# Patient Record
Sex: FEMALE | Race: WHITE | NOT HISPANIC OR LATINO | Employment: OTHER | URBAN - METROPOLITAN AREA
[De-identification: names, ages, dates, MRNs, and addresses within clinical notes are randomized per-mention and may not be internally consistent; named-entity substitution may affect disease eponyms.]

---

## 2018-05-25 ENCOUNTER — OFFICE VISIT (OUTPATIENT)
Dept: PODIATRY | Facility: CLINIC | Age: 83
End: 2018-05-25
Payer: MEDICARE

## 2018-05-25 VITALS
HEART RATE: 66 BPM | SYSTOLIC BLOOD PRESSURE: 162 MMHG | BODY MASS INDEX: 28.32 KG/M2 | TEMPERATURE: 98 F | DIASTOLIC BLOOD PRESSURE: 97 MMHG | HEIGHT: 61 IN | WEIGHT: 150 LBS

## 2018-05-25 DIAGNOSIS — M79.672 FOOT PAIN, LEFT: ICD-10-CM

## 2018-05-25 DIAGNOSIS — M21.962 ACQUIRED DEFORMITY OF JOINT OF LEFT FOOT: Primary | ICD-10-CM

## 2018-05-25 DIAGNOSIS — M20.12 HALLUX VALGUS OF LEFT FOOT: ICD-10-CM

## 2018-05-25 DIAGNOSIS — M19.90 ARTHRITIS: ICD-10-CM

## 2018-05-25 PROCEDURE — 99214 OFFICE O/P EST MOD 30 MIN: CPT | Mod: S$PBB,,, | Performed by: PODIATRIST

## 2018-05-25 PROCEDURE — 99999 PR PBB SHADOW E&M-NEW PATIENT-LVL II: CPT | Mod: PBBFAC,,, | Performed by: PODIATRIST

## 2018-05-25 PROCEDURE — 99202 OFFICE O/P NEW SF 15 MIN: CPT | Mod: PBBFAC | Performed by: PODIATRIST

## 2018-05-30 NOTE — PROGRESS NOTES
Subjective:      Patient ID: Chon Pickett is a 89 y.o. female.    Chief Complaint: Foot Pain  Patient presents with complaint of pain left foot due to severe bunion andunderlying bony deformity which has caused a chronic callus for many years.  Patient uses Dr. Shea donut pads and wears appropriate shoes.  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    ROS     Constitutional   Constitutional: Well-developed, well-nourished, no distress, no fever, no night          sweats, no significant weight gain, no significant          weight loss, no exercise intolerance     Eyes         Eyes: no dry eyes, no irritation, no vision change     ENMT   Ears: no difficulty hearing, no ear pain   Nose: no frequent nosebleeds, no nose/sinus problems   Mouth/Throat: no sore throat, MUMBLED SPEECH     Cardiovascular          Cardiovascular: no chest pain, no arm pain on exertion, no shortness of breath                  when walking, no palpitations    Respiratory           Respiratory: no cough, no wheezing, no congestion    Gastrointestinal   Gastrointestinal: no abdominal pain, no vomiting, normal appetite, no diarrhea,                no vomitting    Genitourinary   Genitourinary: no incontinence, no difficulty urinating, no increased frequency     Musculoskeletal        Musculoskeletal: no muscle aches, YES muscle weakness, YES arthralgias/joint pain,                   no back pain, no swelling in the extremities    Integumentary   Skin: no abnormal mole, no jaundice, no rashes     Neurologic          Neurologic: no loss of consciousness, YES weakness, no numbness, no seizures,                   no dizziness, no headaches     Psychiatric   Psych: no depression, no sleep disturbances    Endocrine   Endocrine: no fatigue     Hematologic/Lymphatic         No bruising     Allergic/Immunologic    Allergy/Immunologic: no runny nose, no sinus pressure, no itching, no hives,              no   frequent sneezing             Objective:      Physical Exam  Vascular   Arterial Pulses Right: posterior tibialis 1/4, dorsalis pedis 1/4   Arterial Pulses Left: posterior tibialis 1/4, dorsalis pedis 1/4   Mildlower extremity edema bilateral   Varicosities Right: capillary refill test normal   Varicosities Left: capillary refill test normal (pedal skin color and temperature are normal bilateral)   Foot Right: shiny, atrophic skin changes  Foot Left: shiny, atrophic skin changes     Integumentary   Moderate tender hyperkeratotic tissue sub 2nd MPJ left foot, due to elevated/overriding 2nd digit left foot with plantar flexed 2nd metatarsal head.  Upon debridement there is no discoloration.  There is minimal edema, no erythema or calor.  It is obvious where patient uses donut pad over this area on a regular basis.  This is a developed a chronic bulge of skin which herniated through the center of the donut pad and contributes to patient's pain.  Skin texture is soft, thin, tight, fragile. Severe HAV deformity left foot with significant lateral deviation of hallux. Arthritic, bony foot type with arthritic deformities, lack of fat pad bilateral. No skin breaks, bruises, abrasions bilateral feet.     Neurological   Neurological Right: gross sensation intact   Neurological Left: gross sensation intact, no parasthesias bilateral   Manual Muscle Test:  Weekend but consistent with age and history of stroke.  Overall it is much improved and patient is using her walker today.    Musculoskeletal   Muscle Strength and Tone Right: normal, normal tone   Muscle Strength and Tone Left: normal, normal tone   Joints, Bones, and Muscles Right: contractures, prominence   Joints, Bones, and Muscles Left: contractures (left greater than right foot), pain to palpation, prominence, hammertoe deformity (second left), HAV with bunion (arthritic and degenerative changes throughout both feet)           Assessment:       Encounter Diagnoses    Name Primary?   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  Advised patient there is some mild inflammation and changes to the skin due to the donut pad she is using.  I recommended that she utilize a flatter pad to help keep the skin from protruding through the center of the callus cushion.  Advised patient this is contributing to raise skin in this area.  Advised patient that her pad is not effective she can continue to use this, but it appears it may be causing additional complications.  We reviewed other appropriate padding to try in this area.  I also reviewed cushioned insoles that she can use in her shoes.  Encouraged patient to continue wearing a walking shoe at all times, should be placed on her feet prior to getting out of bed in the morning.  Encouraged patient to have someone check this area for her on a regular basis and contact the office immediately with any changes or recurrence of pain.  Patient related she was in understanding and agreement  I counseled the patient on her conditions, their implications and medical management.  Instructed patient to contact the office with any changes, questions, concerns, worsening of symptoms. Patient verbalized understanding.   Total face to face time, exam, assessment, treatment, discussion, 25 minutes, more than half this time spent on consultation and coordination of care.   Follow up as needed.

## 2018-06-26 ENCOUNTER — LAB VISIT (OUTPATIENT)
Dept: LAB | Facility: HOSPITAL | Age: 83
End: 2018-06-26
Attending: INTERNAL MEDICINE
Payer: MEDICARE

## 2018-06-26 DIAGNOSIS — N39.0 URINARY TRACT INFECTION, SITE NOT SPECIFIED: Primary | ICD-10-CM

## 2018-06-26 LAB
BACTERIA #/AREA URNS HPF: ABNORMAL /HPF
BILIRUB UR QL STRIP: NEGATIVE
CLARITY UR: CLEAR
COLOR UR: YELLOW
GLUCOSE UR QL STRIP: NEGATIVE
HGB UR QL STRIP: NEGATIVE
HYALINE CASTS #/AREA URNS LPF: 0 /LPF
KETONES UR QL STRIP: NEGATIVE
LEUKOCYTE ESTERASE UR QL STRIP: ABNORMAL
MICROSCOPIC COMMENT: ABNORMAL
NITRITE UR QL STRIP: NEGATIVE
PH UR STRIP: 5 [PH] (ref 5–8)
PROT UR QL STRIP: ABNORMAL
RBC #/AREA URNS HPF: 3 /HPF (ref 0–4)
SP GR UR STRIP: 1.01 (ref 1–1.03)
SQUAMOUS #/AREA URNS HPF: 3 /HPF
URN SPEC COLLECT METH UR: ABNORMAL
UROBILINOGEN UR STRIP-ACNC: NEGATIVE EU/DL
WBC #/AREA URNS HPF: 10 /HPF (ref 0–5)

## 2018-06-26 PROCEDURE — 87086 URINE CULTURE/COLONY COUNT: CPT

## 2018-06-26 PROCEDURE — 81000 URINALYSIS NONAUTO W/SCOPE: CPT

## 2018-06-27 ENCOUNTER — LAB VISIT (OUTPATIENT)
Dept: LAB | Facility: HOSPITAL | Age: 83
End: 2018-06-27
Attending: INTERNAL MEDICINE
Payer: MEDICARE

## 2018-06-27 DIAGNOSIS — R19.7 DIARRHEA OF PRESUMED INFECTIOUS ORIGIN: Primary | ICD-10-CM

## 2018-06-27 DIAGNOSIS — R19.7 DIARRHEA: ICD-10-CM

## 2018-06-27 LAB
BACTERIA UR CULT: NO GROWTH
C DIFF GDH STL QL: NEGATIVE
C DIFF TOX A+B STL QL IA: NEGATIVE
OB PNL STL: NEGATIVE
RV AG STL QL IA.RAPID: NEGATIVE

## 2018-06-27 PROCEDURE — 82272 OCCULT BLD FECES 1-3 TESTS: CPT

## 2018-06-27 PROCEDURE — 87045 FECES CULTURE AEROBIC BACT: CPT

## 2018-06-27 PROCEDURE — 87425 ROTAVIRUS AG IA: CPT

## 2018-06-27 PROCEDURE — 87449 NOS EACH ORGANISM AG IA: CPT

## 2018-06-27 PROCEDURE — 87046 STOOL CULTR AEROBIC BACT EA: CPT

## 2018-06-27 PROCEDURE — 87209 SMEAR COMPLEX STAIN: CPT

## 2018-06-27 PROCEDURE — 87328 CRYPTOSPORIDIUM AG IA: CPT

## 2018-06-27 PROCEDURE — 87427 SHIGA-LIKE TOXIN AG IA: CPT | Mod: 59

## 2018-06-27 PROCEDURE — 87798 DETECT AGENT NOS DNA AMP: CPT | Mod: 59

## 2018-06-28 LAB
CRYPTOSP AG STL QL IA: NEGATIVE
G LAMBLIA AG STL QL IA: NORMAL

## 2018-06-29 LAB
E COLI SXT1 STL QL IA: NEGATIVE
E COLI SXT2 STL QL IA: NEGATIVE
O+P STL TRI STN: NORMAL

## 2018-06-30 LAB
NOROVIRUS GI RNA STL QL NAA+PROBE: NOT DETECTED
NOROVIRUS GII RNA STL QL NAA+PROBE: NOT DETECTED
SPECIMEN SOURCE: NORMAL

## 2018-07-02 LAB — BACTERIA STL CULT: NORMAL

## 2018-07-03 ENCOUNTER — LAB VISIT (OUTPATIENT)
Dept: LAB | Facility: HOSPITAL | Age: 83
End: 2018-07-03
Attending: INTERNAL MEDICINE
Payer: MEDICARE

## 2018-07-03 DIAGNOSIS — J18.9 UNRESOLVED PNEUMONIA: ICD-10-CM

## 2018-07-03 DIAGNOSIS — J44.1 OBSTRUCTIVE CHRONIC BRONCHITIS WITH EXACERBATION: Primary | ICD-10-CM

## 2018-07-03 LAB
ALBUMIN SERPL BCP-MCNC: 4.1 G/DL
ALP SERPL-CCNC: 50 U/L
ALT SERPL W/O P-5'-P-CCNC: 13 U/L
ANION GAP SERPL CALC-SCNC: 9 MMOL/L
AST SERPL-CCNC: 22 U/L
BILIRUB SERPL-MCNC: 0.2 MG/DL
BNP SERPL-MCNC: 817 PG/ML
BUN SERPL-MCNC: 22 MG/DL
CALCIUM SERPL-MCNC: 9.1 MG/DL
CHLORIDE SERPL-SCNC: 110 MMOL/L
CO2 SERPL-SCNC: 20 MMOL/L
CREAT SERPL-MCNC: 1.3 MG/DL
EST. GFR  (AFRICAN AMERICAN): 40.3 ML/MIN/1.73 M^2
EST. GFR  (NON AFRICAN AMERICAN): 35 ML/MIN/1.73 M^2
GLUCOSE SERPL-MCNC: 118 MG/DL
POTASSIUM SERPL-SCNC: 3.2 MMOL/L
PROT SERPL-MCNC: 7.3 G/DL
SODIUM SERPL-SCNC: 139 MMOL/L

## 2018-07-03 PROCEDURE — 83880 ASSAY OF NATRIURETIC PEPTIDE: CPT

## 2018-07-03 PROCEDURE — 80053 COMPREHEN METABOLIC PANEL: CPT

## 2018-07-03 PROCEDURE — 36415 COLL VENOUS BLD VENIPUNCTURE: CPT

## 2018-07-17 ENCOUNTER — LAB VISIT (OUTPATIENT)
Dept: LAB | Facility: HOSPITAL | Age: 83
End: 2018-07-17
Attending: NURSE PRACTITIONER
Payer: MEDICARE

## 2018-07-17 DIAGNOSIS — J44.1 OBSTRUCTIVE CHRONIC BRONCHITIS WITH EXACERBATION: Primary | ICD-10-CM

## 2018-07-17 LAB
ANION GAP SERPL CALC-SCNC: 10 MMOL/L
BNP SERPL-MCNC: 701 PG/ML
BUN SERPL-MCNC: 22 MG/DL
CALCIUM SERPL-MCNC: 9.1 MG/DL
CHLORIDE SERPL-SCNC: 107 MMOL/L
CO2 SERPL-SCNC: 23 MMOL/L
CREAT SERPL-MCNC: 1.3 MG/DL
EST. GFR  (AFRICAN AMERICAN): 40.3 ML/MIN/1.73 M^2
EST. GFR  (NON AFRICAN AMERICAN): 35 ML/MIN/1.73 M^2
GLUCOSE SERPL-MCNC: 79 MG/DL
POTASSIUM SERPL-SCNC: 3.4 MMOL/L
SODIUM SERPL-SCNC: 140 MMOL/L

## 2018-07-17 PROCEDURE — 36415 COLL VENOUS BLD VENIPUNCTURE: CPT

## 2018-07-17 PROCEDURE — 80048 BASIC METABOLIC PNL TOTAL CA: CPT

## 2018-07-17 PROCEDURE — 83880 ASSAY OF NATRIURETIC PEPTIDE: CPT

## 2018-07-19 ENCOUNTER — LAB VISIT (OUTPATIENT)
Dept: LAB | Facility: HOSPITAL | Age: 83
End: 2018-07-19
Attending: NURSE PRACTITIONER
Payer: MEDICARE

## 2018-07-19 DIAGNOSIS — J44.1 OBSTRUCTIVE CHRONIC BRONCHITIS WITH EXACERBATION: Primary | ICD-10-CM

## 2018-07-19 LAB
ANION GAP SERPL CALC-SCNC: 9 MMOL/L
BUN SERPL-MCNC: 30 MG/DL
CALCIUM SERPL-MCNC: 9.1 MG/DL
CHLORIDE SERPL-SCNC: 109 MMOL/L
CO2 SERPL-SCNC: 22 MMOL/L
CREAT SERPL-MCNC: 1.7 MG/DL
EST. GFR  (AFRICAN AMERICAN): 29.1 ML/MIN/1.73 M^2
EST. GFR  (NON AFRICAN AMERICAN): 25.3 ML/MIN/1.73 M^2
GLUCOSE SERPL-MCNC: 127 MG/DL
POTASSIUM SERPL-SCNC: 3.6 MMOL/L
SODIUM SERPL-SCNC: 140 MMOL/L

## 2018-07-19 PROCEDURE — 36415 COLL VENOUS BLD VENIPUNCTURE: CPT

## 2018-07-19 PROCEDURE — 80048 BASIC METABOLIC PNL TOTAL CA: CPT

## 2018-08-14 ENCOUNTER — LAB VISIT (OUTPATIENT)
Dept: LAB | Facility: HOSPITAL | Age: 83
End: 2018-08-14
Attending: INTERNAL MEDICINE
Payer: MEDICARE

## 2018-08-14 DIAGNOSIS — I10 ESSENTIAL HYPERTENSION, BENIGN: Primary | ICD-10-CM

## 2018-08-14 LAB
ANION GAP SERPL CALC-SCNC: 10 MMOL/L
BNP SERPL-MCNC: 396 PG/ML
BUN SERPL-MCNC: 40 MG/DL
CALCIUM SERPL-MCNC: 9.3 MG/DL
CHLORIDE SERPL-SCNC: 109 MMOL/L
CO2 SERPL-SCNC: 22 MMOL/L
CREAT SERPL-MCNC: 1.5 MG/DL
ERYTHROCYTE [DISTWIDTH] IN BLOOD BY AUTOMATED COUNT: 16.4 %
EST. GFR  (AFRICAN AMERICAN): 33.7 ML/MIN/1.73 M^2
EST. GFR  (NON AFRICAN AMERICAN): 29.2 ML/MIN/1.73 M^2
GLUCOSE SERPL-MCNC: 116 MG/DL
HCT VFR BLD AUTO: 31.5 %
HGB BLD-MCNC: 9.7 G/DL
MCH RBC QN AUTO: 28.8 PG
MCHC RBC AUTO-ENTMCNC: 30.8 G/DL
MCV RBC AUTO: 94 FL
PLATELET # BLD AUTO: 191 K/UL
PMV BLD AUTO: 10.2 FL
POTASSIUM SERPL-SCNC: 3.6 MMOL/L
RBC # BLD AUTO: 3.37 M/UL
SODIUM SERPL-SCNC: 141 MMOL/L
TSH SERPL DL<=0.005 MIU/L-ACNC: 0.68 UIU/ML
WBC # BLD AUTO: 3.55 K/UL

## 2018-08-14 PROCEDURE — 80048 BASIC METABOLIC PNL TOTAL CA: CPT

## 2018-08-14 PROCEDURE — 83880 ASSAY OF NATRIURETIC PEPTIDE: CPT

## 2018-08-14 PROCEDURE — 36415 COLL VENOUS BLD VENIPUNCTURE: CPT

## 2018-08-14 PROCEDURE — 85027 COMPLETE CBC AUTOMATED: CPT

## 2018-08-14 PROCEDURE — 84443 ASSAY THYROID STIM HORMONE: CPT

## 2018-10-02 ENCOUNTER — LAB VISIT (OUTPATIENT)
Dept: LAB | Facility: HOSPITAL | Age: 83
End: 2018-10-02
Attending: INTERNAL MEDICINE
Payer: MEDICARE

## 2018-10-02 DIAGNOSIS — I10 ESSENTIAL HYPERTENSION, MALIGNANT: Primary | ICD-10-CM

## 2018-10-02 LAB
ERYTHROCYTE [DISTWIDTH] IN BLOOD BY AUTOMATED COUNT: 16.1 %
HCT VFR BLD AUTO: 33.5 %
HGB BLD-MCNC: 10.3 G/DL
MCH RBC QN AUTO: 28.9 PG
MCHC RBC AUTO-ENTMCNC: 30.7 G/DL
MCV RBC AUTO: 94 FL
PLATELET # BLD AUTO: 177 K/UL
PMV BLD AUTO: 10.1 FL
RBC # BLD AUTO: 3.56 M/UL
WBC # BLD AUTO: 3.06 K/UL

## 2018-10-02 PROCEDURE — 36415 COLL VENOUS BLD VENIPUNCTURE: CPT

## 2018-10-02 PROCEDURE — 85027 COMPLETE CBC AUTOMATED: CPT

## 2018-10-04 ENCOUNTER — OFFICE VISIT (OUTPATIENT)
Dept: PODIATRY | Facility: CLINIC | Age: 83
End: 2018-10-04
Payer: MEDICARE

## 2018-10-04 VITALS
BODY MASS INDEX: 19.32 KG/M2 | HEART RATE: 89 BPM | TEMPERATURE: 98 F | WEIGHT: 105 LBS | DIASTOLIC BLOOD PRESSURE: 77 MMHG | HEIGHT: 62 IN | SYSTOLIC BLOOD PRESSURE: 144 MMHG

## 2018-10-04 DIAGNOSIS — M21.962 ACQUIRED DEFORMITY OF JOINT OF LEFT FOOT: Primary | ICD-10-CM

## 2018-10-04 DIAGNOSIS — M19.90 ARTHRITIS: ICD-10-CM

## 2018-10-04 DIAGNOSIS — B35.1 ONYCHOMYCOSIS DUE TO DERMATOPHYTE: ICD-10-CM

## 2018-10-04 DIAGNOSIS — M79.672 FOOT PAIN, LEFT: ICD-10-CM

## 2018-10-04 DIAGNOSIS — M20.12 HALLUX VALGUS OF LEFT FOOT: ICD-10-CM

## 2018-10-04 PROCEDURE — 99214 OFFICE O/P EST MOD 30 MIN: CPT | Mod: S$PBB,,, | Performed by: PODIATRIST

## 2018-10-04 PROCEDURE — 99999 PR PBB SHADOW E&M-EST. PATIENT-LVL III: CPT | Mod: PBBFAC,,, | Performed by: PODIATRIST

## 2018-10-04 PROCEDURE — 99213 OFFICE O/P EST LOW 20 MIN: CPT | Mod: PBBFAC | Performed by: PODIATRIST

## 2018-10-04 RX ORDER — DICLOFENAC SODIUM 10 MG/G
1 GEL TOPICAL
COMMUNITY
End: 2019-09-03

## 2018-10-04 RX ORDER — FUROSEMIDE 20 MG/1
TABLET ORAL
COMMUNITY
End: 2019-09-03

## 2018-10-04 RX ORDER — OLOPATADINE HYDROCHLORIDE 2 MG/ML
SOLUTION/ DROPS OPHTHALMIC
COMMUNITY
Start: 2018-09-30 | End: 2019-11-06 | Stop reason: SDUPTHER

## 2018-10-04 RX ORDER — ESCITALOPRAM OXALATE 10 MG/1
TABLET ORAL
COMMUNITY

## 2018-10-04 RX ORDER — HYDRALAZINE HYDROCHLORIDE 25 MG/1
TABLET, FILM COATED ORAL
COMMUNITY
Start: 2018-09-27

## 2018-10-04 RX ORDER — CLONIDINE HYDROCHLORIDE 0.1 MG/1
TABLET ORAL
COMMUNITY
Start: 2018-07-02

## 2018-10-04 RX ORDER — OXYBUTYNIN CHLORIDE 10 MG/1
TABLET, EXTENDED RELEASE ORAL
COMMUNITY
Start: 2018-09-27 | End: 2019-09-03

## 2018-10-04 RX ORDER — POTASSIUM CHLORIDE 750 MG/1
TABLET, EXTENDED RELEASE ORAL
COMMUNITY
Start: 2018-07-05 | End: 2019-11-06

## 2018-10-04 RX ORDER — ALPRAZOLAM 0.25 MG/1
TABLET ORAL
COMMUNITY
Start: 2018-09-24 | End: 2019-11-06 | Stop reason: SDUPTHER

## 2018-10-04 RX ORDER — AMLODIPINE BESYLATE 2.5 MG/1
TABLET ORAL
COMMUNITY
End: 2019-09-03

## 2018-10-04 RX ORDER — CARVEDILOL 3.12 MG/1
TABLET ORAL
COMMUNITY

## 2018-10-04 RX ORDER — TOLTERODINE TARTRATE 2 MG/1
TABLET, EXTENDED RELEASE ORAL
COMMUNITY
End: 2019-09-03

## 2018-10-04 RX ORDER — PROPRANOLOL HYDROCHLORIDE 40 MG/1
TABLET ORAL
COMMUNITY
End: 2019-09-03

## 2018-10-04 RX ORDER — MELOXICAM 7.5 MG/1
TABLET ORAL
COMMUNITY
End: 2019-09-03

## 2018-10-04 RX ORDER — ALPRAZOLAM 0.5 MG/1
TABLET ORAL
COMMUNITY
End: 2019-11-06

## 2018-10-04 RX ORDER — TEMAZEPAM 15 MG/1
CAPSULE ORAL
COMMUNITY
End: 2019-09-03

## 2018-10-04 RX ORDER — FLUTICASONE PROPIONATE 50 MCG
SPRAY, SUSPENSION (ML) NASAL
COMMUNITY

## 2018-10-04 RX ORDER — LISINOPRIL 20 MG/1
TABLET ORAL
COMMUNITY
End: 2019-09-03

## 2018-10-04 RX ORDER — FUROSEMIDE 40 MG/1
TABLET ORAL
COMMUNITY
Start: 2018-09-27

## 2018-10-04 RX ORDER — METOPROLOL TARTRATE 25 MG/1
TABLET, FILM COATED ORAL
COMMUNITY
Start: 2018-09-27

## 2018-10-04 RX ORDER — OMEPRAZOLE 40 MG/1
CAPSULE, DELAYED RELEASE ORAL
COMMUNITY

## 2018-10-04 RX ORDER — OLOPATADINE HYDROCHLORIDE 2 MG/ML
SOLUTION/ DROPS OPHTHALMIC
COMMUNITY

## 2018-10-13 NOTE — PROGRESS NOTES
Subjective:      Patient ID: Chon Pickett is a 89 y.o. female.    Chief Complaint: Follow-up; Nail Problem; and Foot Problem  Patient presents with complaint of pain left foot due to severe bunion with plantarflexed metatarsal,   chronic pain, tries to manage with callus cushions, proper shoes. Reports pain in toes.        ROS     Constitutional   well-nourished, pleasant, no distress, well oriented      ENMT   MUMBLED SPEECH/h/o stroke     Cardiovascular          Cardiovascular: no chest pain, no arm pain on exertion, no shortness of breath                  when walking, no palpitations    Respiratory           Respiratory: no cough, no wheezing, no congestion    Musculoskeletal         YES muscle weakness, YES arthralgias/joint pain/ARTHRITIS, no swelling in the extremities      Neurologic        YES weakness/h/o STROKE       Endocrine   some fatigue             Objective:      Physical Exam  Vascular   Arterial Pulses Right: posterior tibialis 1/4, dorsalis pedis 1/4   Arterial Pulses Left: posterior tibialis 1/4, dorsalis pedis 1/4   Mildlower extremity edema bilateral   Capillary refill test normal   Pedal skin color and temperature are normal bilateral   Shiny, atrophic skin changes    Integumentary   Moderate tender hyperkeratotic tissue sub 2nd MPJ left foot, due to elevated/overriding 2nd digit             left foot with plantarflexed 2nd metatarsal head.  Upon debridement there is no discoloration.             There is minimal edema, no erythema or calor.  Decreased chronic bulge of skin.            Skin texture is soft, thin, tight, fragile. Severe HAV deformity left foot with significant lateral deviation            of hallux. Arthritic, bony foot type with arthritic deformities, lack of fat pad bilateral. No skin breaks,            bruises, abrasions bilateral feet. Onychomycosis, dystrophic, raised. Due to contracture several            Are tender. Upon reducing thickness, no evidence of ingrown  nail, infection or subungual abscess.    Neurological   Gross sensation intact    Musculoskeletal   Muscle Strength and Tone: Weak, consistent with age and history of stroke, normal for age  Joints, Bones, and Muscles: contractures, prominence (left greater than right foot), pain to             palpation, prominence, hammertoe deformity (second left), HAV with bunion (arthritic and                        degenerative changes throughout both feet)             Uses walker well           Assessment:       Encounter Diagnoses   Name Primary?   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 Encouraged patient to continue walking shoe at all times, should be placed   on her feet prior to getting out of bed in the morning.  Encouraged patient to have someone check this area for her on a regular basis and contact the office   immediately with any changes or recurrence of pain.    Reviewed potential complications of bony deformity, bunions, arthritis, contracted digits, calluses, nails.  Patient related she was in understanding and agreement  I counseled the patient on her conditions, their implications and medical management.  Instructed patient to contact the office with any changes, questions, concerns, worsening of symptoms. Patient   verbalized understanding.   Total face to face time, exam, assessment, treatment, discussion, documentation 25 minutes, more than half this   time spent on consultation and coordination of care.   Follow up as needed.       This note was created using [employer] voice recognition  software that occasionally misinterpreted phrases or words.

## 2018-11-20 ENCOUNTER — LAB VISIT (OUTPATIENT)
Dept: LAB | Facility: HOSPITAL | Age: 83
End: 2018-11-20
Attending: INTERNAL MEDICINE
Payer: MEDICARE

## 2018-11-20 DIAGNOSIS — I50.20 HEART FAILURE, SYSTOLIC: Primary | ICD-10-CM

## 2018-11-20 DIAGNOSIS — I10 ESSENTIAL HYPERTENSION, MALIGNANT: ICD-10-CM

## 2018-11-20 LAB
ANION GAP SERPL CALC-SCNC: 11 MMOL/L
BNP SERPL-MCNC: 441 PG/ML
BUN SERPL-MCNC: 29 MG/DL
CALCIUM SERPL-MCNC: 9.2 MG/DL
CHLORIDE SERPL-SCNC: 101 MMOL/L
CO2 SERPL-SCNC: 26 MMOL/L
CREAT SERPL-MCNC: 1.8 MG/DL
EST. GFR  (AFRICAN AMERICAN): 27 ML/MIN/1.73 M^2
EST. GFR  (NON AFRICAN AMERICAN): 23.4 ML/MIN/1.73 M^2
GLUCOSE SERPL-MCNC: 79 MG/DL
POTASSIUM SERPL-SCNC: 3.8 MMOL/L
SODIUM SERPL-SCNC: 138 MMOL/L

## 2018-11-20 PROCEDURE — 80048 BASIC METABOLIC PNL TOTAL CA: CPT

## 2018-11-20 PROCEDURE — 83880 ASSAY OF NATRIURETIC PEPTIDE: CPT

## 2018-11-20 PROCEDURE — 36415 COLL VENOUS BLD VENIPUNCTURE: CPT

## 2019-01-11 ENCOUNTER — OFFICE VISIT (OUTPATIENT)
Dept: PODIATRY | Facility: CLINIC | Age: 84
End: 2019-01-11
Payer: MEDICARE

## 2019-01-11 VITALS
BODY MASS INDEX: 19.32 KG/M2 | HEART RATE: 55 BPM | RESPIRATION RATE: 18 BRPM | SYSTOLIC BLOOD PRESSURE: 197 MMHG | DIASTOLIC BLOOD PRESSURE: 75 MMHG | TEMPERATURE: 98 F | WEIGHT: 105 LBS | OXYGEN SATURATION: 98 % | HEIGHT: 62 IN

## 2019-01-11 DIAGNOSIS — M79.672 FOOT PAIN, LEFT: ICD-10-CM

## 2019-01-11 DIAGNOSIS — M19.90 ARTHRITIS: ICD-10-CM

## 2019-01-11 DIAGNOSIS — M20.62 ACQUIRED DEFORMITY OF LEFT TOE: ICD-10-CM

## 2019-01-11 DIAGNOSIS — M20.12 HALLUX VALGUS OF LEFT FOOT: ICD-10-CM

## 2019-01-11 DIAGNOSIS — L84 FOOT CALLUS: ICD-10-CM

## 2019-01-11 DIAGNOSIS — M21.962 ACQUIRED DEFORMITY OF JOINT OF LEFT FOOT: Primary | ICD-10-CM

## 2019-01-11 PROCEDURE — 99999 PR PBB SHADOW E&M-EST. PATIENT-LVL III: CPT | Mod: PBBFAC,,, | Performed by: PODIATRIST

## 2019-01-11 PROCEDURE — 99213 OFFICE O/P EST LOW 20 MIN: CPT | Mod: PBBFAC | Performed by: PODIATRIST

## 2019-01-11 PROCEDURE — 99214 PR OFFICE/OUTPT VISIT, EST, LEVL IV, 30-39 MIN: ICD-10-PCS | Mod: S$PBB,,, | Performed by: PODIATRIST

## 2019-01-11 PROCEDURE — 99999 PR PBB SHADOW E&M-EST. PATIENT-LVL III: ICD-10-PCS | Mod: PBBFAC,,, | Performed by: PODIATRIST

## 2019-01-11 PROCEDURE — 99214 OFFICE O/P EST MOD 30 MIN: CPT | Mod: S$PBB,,, | Performed by: PODIATRIST

## 2019-01-14 ENCOUNTER — HOSPITAL ENCOUNTER (EMERGENCY)
Facility: HOSPITAL | Age: 84
Discharge: HOME | End: 2019-01-14
Attending: FAMILY MEDICINE
Payer: MEDICARE

## 2019-01-14 VITALS
BODY MASS INDEX: 18.95 KG/M2 | HEART RATE: 51 BPM | OXYGEN SATURATION: 94 % | TEMPERATURE: 98 F | HEIGHT: 62 IN | DIASTOLIC BLOOD PRESSURE: 75 MMHG | WEIGHT: 103 LBS | SYSTOLIC BLOOD PRESSURE: 189 MMHG | RESPIRATION RATE: 16 BRPM

## 2019-01-14 DIAGNOSIS — R52 PAIN: ICD-10-CM
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
## 2019-01-14 PROCEDURE — 73030 X-RAY EXAM OF SHOULDER: CPT | Mod: TC,FY,RT

## 2019-01-14 PROCEDURE — 73030 X-RAY EXAM OF SHOULDER: CPT | Mod: 26,RT,, | Performed by: RADIOLOGY

## 2019-01-14 PROCEDURE — 99284 EMERGENCY DEPT VISIT MOD MDM: CPT | Mod: 25

## 2019-01-14 PROCEDURE — 73030 XR SHOULDER COMPLETE 2 OR MORE VIEWS RIGHT: ICD-10-PCS | Mod: 26,RT,, | Performed by: RADIOLOGY

## 2019-01-14 PROCEDURE — 70450 CT HEAD/BRAIN W/O DYE: CPT | Mod: 26,,, | Performed by: RADIOLOGY

## 2019-01-14 PROCEDURE — 70450 CT HEAD/BRAIN W/O DYE: CPT | Mod: TC

## 2019-01-14 PROCEDURE — 70450 CT HEAD WITHOUT CONTRAST: ICD-10-PCS | Mod: 26,,, | Performed by: RADIOLOGY

## 2019-01-14 NOTE — ED NOTES
Pt presents to ER via [facility] with complaints of right ear pain secondary to mechanical fall off her bed. No reported LOC. Pt aaox4, gcs 15

## 2019-01-14 NOTE — PROGRESS NOTES
Subjective:      Patient ID: Chon Pickett is a 89 y.o. female.    Chief Complaint: Follow-up  Patient presents with complaint of pain left foot due to severe bunion,  overriding 2nd digit with   plantarflexed metatarsal, chronic pain, tries to manage with callus cushions, proper shoes.   is wearing shoes at all times, puts them on immediately in the morning.  Having more difficulty   getting cushion on the left foot has been without at last few weeks.  Reports usual fatigue which   she attributes to her age.  Feels otherwise she is doing very well.    ROS     Constitutional   petite, pleasant, no distress, well oriented      ENMT   MUMBLED SPEECH due to h/o stroke     Cardiovascular         no chest pain, no shortness of breath    Respiratory          no cough, no congestion    Musculoskeletal         YES muscle weakness, YES arthralgias/joint pain/ARTHRITIS, no swelling in the extremities      Neurologic        YES weakness/h/o STROKE       Endocrine   YES fatigue, age  appropriate          Objective:      Physical Exam  Vascular   Arterial Pulses Right: posterior tibialis 1/4, dorsalis pedis 1/4   Arterial Pulses Left: posterior tibialis 1/4, dorsalis pedis 1/4   Mildlower extremity edema bilateral   Capillary refill test normal   Pedal skin color and temperature are normal bilateral   Shiny, atrophic skin changes    Integumentary   Moderate tender hyperkeratotic tissue sub 2nd MPJ left foot, due to elevated/overriding 2nd digit             left foot with plantarflexed 2nd metatarsal head.  Upon debridement there is no discoloration.              Minimal edema, no erythema or calor.  Due to chronicity and multiple bony deformities this             area is considered preulcerative.   Skin texture is soft, thin, tight, fragile.   Severe HAV deformity left foot with significant lateral deviation of hallux.   Arthritic, bony foot type with arthritic deformities, lack of fat pad bilateral.   No skin breaks, bruises,  abrasions bilateral feet.   Onychomycosis, fairly well maintained    Neurological   Gross sensation intact    Musculoskeletal   Muscle Strength and Tone: Weak, consistent with age and history of stroke, normal for age  Joints, Bones, and Muscles: contractures, prominence (left greater than right foot), pain to             palpation, prominence, hammertoe deformity (second left), HAV with bunion, arthritic             and degenerative changes throughout both feet)   Uses walker well           Assessment:       Encounter Diagnoses   Name Primary?   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  Reviewed appropriate shoes and cushioned padding.   Continue walking shoe at all times.  Reviewed potential complications of callus, arthritis, contracted digits.  Advised patient someone needs to check the area on the bottom of the left foot weekly.  Patient related she was in understanding and agreement  I counseled the patient on her conditions, their implications and medical management.  Instructed patient to contact the office with any changes, questions, concerns, worsening of symptoms.   Patient verbalized understanding.   Total face to face time, exam, assessment, treatment, discussion, documentation 25 minutes, more   than half this time spent on consultation and coordination of care.   Follow up as needed.       This note was created using [employer] voice recognition software that occasionally misinterpreted phrases   or  words.

## 2019-01-14 NOTE — ED PROVIDER NOTES
Encounter Date: 1/14/2019       History     Chief Complaint   Patient presents with    Fall     pt fell off bed and complains of pain to right ear     Patient states she became tangled in her sheet at her local convalescent home and fell hitting her right ear on a bedside table.  Denies any loss of consciousness.  Complains of tenderness to right ear pinna and right shoulder.          Review of patient's allergies indicates:  No Known Allergies  Past Medical History:   Diagnosis Date    Arthritis     Hypertension     Stroke 2006     Past Surgical History:   Procedure Laterality Date    COLON SURGERY      HYSTERECTOMY       History reviewed. No pertinent family history.  Social History     Tobacco Use    Smoking status: Never Smoker    Smokeless tobacco: Never Used   Substance Use Topics    Alcohol use: No     Frequency: Never    Drug use: No     Review of Systems   Constitutional: Negative.    HENT: Negative.    Eyes: Negative.    Respiratory: Negative.    Cardiovascular: Negative.    Gastrointestinal: Negative.    Endocrine: Negative.    Genitourinary: Negative.    Musculoskeletal:        Refer to HPI   Skin:        Abrasions to posterior right ear.   Allergic/Immunologic: Negative.    Neurological: Negative.    Hematological: Negative.    Psychiatric/Behavioral: Negative.        Physical Exam     Initial Vitals [01/14/19 0241]   BP Pulse Resp Temp SpO2   (!) 184/78 (!) 55 16 98.4 °F (36.9 °C) 95 %      MAP       --         Physical Exam    Nursing note and vitals reviewed.  Constitutional: She appears well-developed and well-nourished. She is not diaphoretic. No distress.   Beautiful well-kept elderly female in no apparent distress.   HENT:   Head: Normocephalic and atraumatic.   Ecchymosis noted to right ear pinna, no appreciable hematoma.  Several mild superficial abrasions to posterior right pinna.  Bleeding controlled.   Eyes: Conjunctivae and EOM are normal. Pupils are equal, round, and reactive to  light.   Neck: Normal range of motion. Neck supple.   Cardiovascular: Normal rate, regular rhythm, normal heart sounds and intact distal pulses. Exam reveals no gallop and no friction rub.    No murmur heard.  Pulmonary/Chest: Breath sounds normal. No respiratory distress. She has no wheezes. She has no rales.   Abdominal: Soft. Bowel sounds are normal. She exhibits no distension. There is no tenderness.   Musculoskeletal: Normal range of motion. She exhibits no edema or tenderness.   Normal right shoulder exam.  No abrasions or ecchymosis noted.   Neurological: She is alert and oriented to person, place, and time. She has normal strength.   Skin: Skin is warm and dry. Capillary refill takes less than 2 seconds. No rash noted. No erythema.   Psychiatric: She has a normal mood and affect. Her behavior is normal. Judgment and thought content normal.         ED Course   Procedures  Labs Reviewed - No data to display       Imaging Results          X-ray Shoulder 2 or More Views Right (In process)  Result time 01/14/19 03:08:28   Procedure changed from X-Ray Shoulder 1 View Right                CT Head Without Contrast (In process)                                    ED Course as of Jan 14 0429   Mon Jan 14, 2019   0337 COMPARISON:  CT HEAD WITHOUT CONTRAST 3/23/2018 7:21 AM  FINDINGS:  Brain: There is moderate diffuse cerebral atrophy present, consistent with this patient's age. There  is moderate diffuse heterogeneity of the white matter attenuation, consistent with chronic white matter  ischemic changes. No evidence of intracranial hemorrhage.  Ventricles: Normal. No ventriculomegaly.  Bones/joints: Normal. No acute fracture.  Sinuses: Normal as visualized. No acute sinusitis.  Mastoid air cells: Normal as visualized. No mastoid effusion.  Soft tissues: Normal.  IMPRESSION:  No acute intracranial findings.  [MD]      ED Course User Index  [MD] Leona Bonilla MD     Clinical Impression:   The primary encounter  diagnosis was Fall, initial encounter. A diagnosis of Pain was also pertinent to this visit.                             Leona Bonilla MD  01/14/19 0694

## 2019-01-14 NOTE — DISCHARGE INSTRUCTIONS
Local wound care to right ear until completely healed.  Wash twice daily with soap and water, pat dry and allow to heal.  Follow-up with primary care provider or return to the ER if any signs of infection develop.

## 2019-02-14 ENCOUNTER — LAB VISIT (OUTPATIENT)
Dept: LAB | Facility: HOSPITAL | Age: 84
End: 2019-02-14
Attending: INTERNAL MEDICINE
Payer: MEDICARE

## 2019-02-14 DIAGNOSIS — D64.9 ANEMIA, UNSPECIFIED: ICD-10-CM

## 2019-02-14 DIAGNOSIS — I51.9 MYXEDEMA HEART DISEASE: Primary | ICD-10-CM

## 2019-02-14 DIAGNOSIS — E03.9 MYXEDEMA HEART DISEASE: Primary | ICD-10-CM

## 2019-02-14 LAB
BASOPHILS # BLD AUTO: 0.01 K/UL
BASOPHILS NFR BLD: 0.3 %
DIFFERENTIAL METHOD: ABNORMAL
EOSINOPHIL # BLD AUTO: 0.1 K/UL
EOSINOPHIL NFR BLD: 2.7 %
ERYTHROCYTE [DISTWIDTH] IN BLOOD BY AUTOMATED COUNT: 15.3 %
HCT VFR BLD AUTO: 37.1 %
HGB BLD-MCNC: 11.6 G/DL
IMM GRANULOCYTES # BLD AUTO: 0.03 K/UL
IMM GRANULOCYTES NFR BLD AUTO: 0.9 %
LYMPHOCYTES # BLD AUTO: 0.8 K/UL
LYMPHOCYTES NFR BLD: 22.8 %
MCH RBC QN AUTO: 29.2 PG
MCHC RBC AUTO-ENTMCNC: 31.3 G/DL
MCV RBC AUTO: 94 FL
MONOCYTES # BLD AUTO: 0.4 K/UL
MONOCYTES NFR BLD: 11.7 %
NEUTROPHILS # BLD AUTO: 2.1 K/UL
NEUTROPHILS NFR BLD: 61.6 %
NRBC BLD-RTO: 0 /100 WBC
PLATELET # BLD AUTO: 130 K/UL
PMV BLD AUTO: 10.8 FL
RBC # BLD AUTO: 3.97 M/UL
TSH SERPL DL<=0.005 MIU/L-ACNC: 2.71 UIU/ML
WBC # BLD AUTO: 3.33 K/UL

## 2019-02-14 PROCEDURE — 84443 ASSAY THYROID STIM HORMONE: CPT

## 2019-02-14 PROCEDURE — 36415 COLL VENOUS BLD VENIPUNCTURE: CPT

## 2019-02-14 PROCEDURE — 85025 COMPLETE CBC W/AUTO DIFF WBC: CPT

## 2019-03-01 ENCOUNTER — APPOINTMENT (OUTPATIENT)
Dept: LAB | Facility: HOSPITAL | Age: 84
End: 2019-03-01
Attending: NURSE PRACTITIONER
Payer: MEDICARE

## 2019-03-01 DIAGNOSIS — N39.0 URINARY TRACT INFECTION, SITE NOT SPECIFIED: Primary | ICD-10-CM

## 2019-03-01 LAB
BACTERIA #/AREA URNS HPF: ABNORMAL /HPF
BILIRUB UR QL STRIP: NEGATIVE
CLARITY UR: ABNORMAL
COLOR UR: YELLOW
GLUCOSE UR QL STRIP: NEGATIVE
HGB UR QL STRIP: NEGATIVE
KETONES UR QL STRIP: NEGATIVE
LEUKOCYTE ESTERASE UR QL STRIP: ABNORMAL
MICROSCOPIC COMMENT: ABNORMAL
NITRITE UR QL STRIP: NEGATIVE
PH UR STRIP: 6 [PH] (ref 5–8)
PROT UR QL STRIP: NEGATIVE
RBC #/AREA URNS HPF: 5 /HPF (ref 0–4)
SP GR UR STRIP: 1.01 (ref 1–1.03)
SQUAMOUS #/AREA URNS HPF: 3 /HPF
URN SPEC COLLECT METH UR: ABNORMAL
UROBILINOGEN UR STRIP-ACNC: NEGATIVE EU/DL
WBC #/AREA URNS HPF: 60 /HPF (ref 0–5)

## 2019-03-01 PROCEDURE — 87086 URINE CULTURE/COLONY COUNT: CPT

## 2019-03-01 PROCEDURE — 87088 URINE BACTERIA CULTURE: CPT

## 2019-03-01 PROCEDURE — 87077 CULTURE AEROBIC IDENTIFY: CPT | Mod: 59

## 2019-03-01 PROCEDURE — 87186 SC STD MICRODIL/AGAR DIL: CPT | Mod: 59

## 2019-03-01 PROCEDURE — 81000 URINALYSIS NONAUTO W/SCOPE: CPT

## 2019-03-05 ENCOUNTER — LAB VISIT (OUTPATIENT)
Dept: LAB | Facility: HOSPITAL | Age: 84
End: 2019-03-05
Attending: NURSE PRACTITIONER
Payer: MEDICARE

## 2019-03-05 DIAGNOSIS — I10 ESSENTIAL HYPERTENSION, MALIGNANT: Primary | ICD-10-CM

## 2019-03-05 LAB
ANION GAP SERPL CALC-SCNC: 12 MMOL/L
BACTERIA UR CULT: NORMAL
BUN SERPL-MCNC: 42 MG/DL
CALCIUM SERPL-MCNC: 8.3 MG/DL
CHLORIDE SERPL-SCNC: 105 MMOL/L
CO2 SERPL-SCNC: 18 MMOL/L
CREAT SERPL-MCNC: 1.8 MG/DL
EST. GFR  (AFRICAN AMERICAN): 27 ML/MIN/1.73 M^2
EST. GFR  (NON AFRICAN AMERICAN): 23.4 ML/MIN/1.73 M^2
GLUCOSE SERPL-MCNC: 107 MG/DL
POTASSIUM SERPL-SCNC: 3.6 MMOL/L
SODIUM SERPL-SCNC: 135 MMOL/L

## 2019-03-05 PROCEDURE — 36415 COLL VENOUS BLD VENIPUNCTURE: CPT

## 2019-03-05 PROCEDURE — 80048 BASIC METABOLIC PNL TOTAL CA: CPT

## 2019-03-19 ENCOUNTER — LAB VISIT (OUTPATIENT)
Dept: LAB | Facility: HOSPITAL | Age: 84
End: 2019-03-19
Attending: NURSE PRACTITIONER
Payer: MEDICARE

## 2019-03-19 DIAGNOSIS — E87.1 HYPOSMOLALITY SYNDROME: Primary | ICD-10-CM

## 2019-03-19 LAB
ANION GAP SERPL CALC-SCNC: 14 MMOL/L
BUN SERPL-MCNC: 28 MG/DL
CALCIUM SERPL-MCNC: 8.6 MG/DL
CHLORIDE SERPL-SCNC: 102 MMOL/L
CO2 SERPL-SCNC: 22 MMOL/L
CREAT SERPL-MCNC: 1.5 MG/DL
EST. GFR  (AFRICAN AMERICAN): 33.7 ML/MIN/1.73 M^2
EST. GFR  (NON AFRICAN AMERICAN): 29.2 ML/MIN/1.73 M^2
GLUCOSE SERPL-MCNC: 128 MG/DL
POTASSIUM SERPL-SCNC: 3.7 MMOL/L
SODIUM SERPL-SCNC: 138 MMOL/L

## 2019-03-19 PROCEDURE — 80048 BASIC METABOLIC PNL TOTAL CA: CPT

## 2019-03-19 PROCEDURE — 36415 COLL VENOUS BLD VENIPUNCTURE: CPT

## 2019-05-17 ENCOUNTER — OFFICE VISIT (OUTPATIENT)
Dept: PODIATRY | Facility: CLINIC | Age: 84
End: 2019-05-17
Payer: MEDICARE

## 2019-05-17 VITALS
TEMPERATURE: 98 F | DIASTOLIC BLOOD PRESSURE: 69 MMHG | SYSTOLIC BLOOD PRESSURE: 153 MMHG | HEART RATE: 68 BPM | WEIGHT: 103 LBS | HEIGHT: 62 IN | BODY MASS INDEX: 18.95 KG/M2

## 2019-05-17 DIAGNOSIS — B35.1 ONYCHOMYCOSIS DUE TO DERMATOPHYTE: ICD-10-CM

## 2019-05-17 DIAGNOSIS — M20.12 HALLUX VALGUS OF LEFT FOOT: Primary | ICD-10-CM

## 2019-05-17 DIAGNOSIS — L84 FOOT CALLUS: ICD-10-CM

## 2019-05-17 DIAGNOSIS — M79.672 FOOT PAIN, LEFT: ICD-10-CM

## 2019-05-17 DIAGNOSIS — M19.90 ARTHRITIS: ICD-10-CM

## 2019-05-17 DIAGNOSIS — M21.962 ACQUIRED DEFORMITY OF JOINT OF LEFT FOOT: ICD-10-CM

## 2019-05-17 PROCEDURE — 99999 PR PBB SHADOW E&M-EST. PATIENT-LVL III: ICD-10-PCS | Mod: PBBFAC,,, | Performed by: PODIATRIST

## 2019-05-17 PROCEDURE — 99214 PR OFFICE/OUTPT VISIT, EST, LEVL IV, 30-39 MIN: ICD-10-PCS | Mod: S$PBB,,, | Performed by: PODIATRIST

## 2019-05-17 PROCEDURE — 99213 OFFICE O/P EST LOW 20 MIN: CPT | Mod: PBBFAC | Performed by: PODIATRIST

## 2019-05-17 PROCEDURE — 99999 PR PBB SHADOW E&M-EST. PATIENT-LVL III: CPT | Mod: PBBFAC,,, | Performed by: PODIATRIST

## 2019-05-17 PROCEDURE — 99214 OFFICE O/P EST MOD 30 MIN: CPT | Mod: S$PBB,,, | Performed by: PODIATRIST

## 2019-05-20 NOTE — PROGRESS NOTES
Subjective:      Patient ID: Chon Pickett is a 89 y.o. female.    Chief Complaint: Follow-up; Foot Problem (LEFT); and Foot Pain  Patient presents with complaint of pain left foot.   Reports this area is not developing as quickly since she is not doing as much walking as usual.  Patient has history of bunion deformity, acquired deformity of joint, toe, arthritis.  Patient resides at done by her nursing home, states she is still using her walker to go out meals, trying to  Continued to do some walking daily to keep up her strength.  Reports usual fatigue due to age, this has not progressed any.  Reports she feels well today.  l.    ROS     Constitutional   Petite, pleasant, no distress, well oriented, mumbled speech  (still does a great job communicating and talks throughout whole visit)    Cardiovascular         No chest pain, no shortness of breath    Respiratory         No cough, no congestion    Musculoskeletal         YES muscle weakness, YES arthralgias/joint pain/ARTHRITIS, no swelling in the extremities      Neurologic        YES weakness/h/o STROKE       Endocrine         YES fatigue, age appropriate        Objective:      Physical Exam  Vascular   Arterial Pulses Right: posterior tibialis 1/4, dorsalis pedis 1/4, normal CFT   Arterial Pulses Left: posterior tibialis 1/4, dorsalis pedis 1/4, normal CFT   No lower extremity edema bilateral   Pedal skin color and temperature are normal bilateral   Shiny, atrophic skin changes    Integumentary   Moderate tender hyperkeratotic tissue sub 2nd MPJ left foot. This is chronic due to HAV,  elevated/overriding 2nd digit left foot with plantarflexed 2nd metatarsal head.  Upon debridement there is no discoloration, some edema, no erythema or calor.  Due to chronicity and multiple bony deformities this area is considered preulcerative.   Severe HAV deformity left foot with significant lateral deviation of hallux   Arthritic, bony foot type with arthritic deformities,  lack of fat pad bilateral        Skin texture is soft, thin, tight, fragile.   No skin breaks, bruises, abrasions bilateral feet   Onychomycosis has progressed bilateral hallux which are mildly tender, thickened discolored, upon reducing thickness no evidence of ingrown nail or subungual abscess    Neurological   Gross sensation intact,  no paresthesias    Musculoskeletal   Muscle Strength and Tone: Weak, consistent with age and history of stroke, normal for age  Joints, Bones, and Muscles: contractures, prominence (left greater than right foot), pain to palpation, prominence, hammertoe deformity (second left), HAV with bunion, arthritic and degenerative changes throughout both feet)   Uses walker well         Presents in appropriate shoes        Assessment:       Encounter Diagnoses   Name Primary?   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  Patient has done a very good job with this area utilizing appropriate shoes.    Reviewed appropriate shoes and cushioned padding.   Continue walking shoe at all times.  Reviewed potential complications of callus, arthritis, contracted digits.  Advised patient someone needs to check the area on the bottom of the left foot weekly.  Reviewed better maintenance of skin and nails  Patient related she was in understanding and agreement  I counseled the patient on her conditions, their implications and medical management.  Instructed patient to contact the  office with any changes, questions, concerns, worsening of symptoms.   Patient verbalized understanding.   Total face to face time, exam, assessment, treatment, discussion, documentation 25 minutes, more than half this time spent on consultation and coordination of care.   Follow up as needed.       This note was created using [employer] voice recognition software that occasionally misinterpreted phrases or words.

## 2019-05-23 ENCOUNTER — LAB VISIT (OUTPATIENT)
Dept: LAB | Facility: HOSPITAL | Age: 84
End: 2019-05-23
Attending: NURSE PRACTITIONER
Payer: MEDICARE

## 2019-05-23 DIAGNOSIS — I10 ESSENTIAL HYPERTENSION, MALIGNANT: Primary | ICD-10-CM

## 2019-05-23 DIAGNOSIS — F01.53 VASCULAR DEMENTIA WITH DEPRESSED MOOD: ICD-10-CM

## 2019-05-23 LAB — BNP SERPL-MCNC: 395 PG/ML (ref 0–99)

## 2019-05-23 PROCEDURE — 83880 ASSAY OF NATRIURETIC PEPTIDE: CPT

## 2019-05-23 PROCEDURE — 36415 COLL VENOUS BLD VENIPUNCTURE: CPT

## 2019-07-11 ENCOUNTER — LAB VISIT (OUTPATIENT)
Dept: LAB | Facility: HOSPITAL | Age: 84
End: 2019-07-11
Attending: INTERNAL MEDICINE
Payer: MEDICARE

## 2019-07-11 DIAGNOSIS — D64.9 ANEMIA, UNSPECIFIED: ICD-10-CM

## 2019-07-11 DIAGNOSIS — I10 ESSENTIAL HYPERTENSION, MALIGNANT: Primary | ICD-10-CM

## 2019-07-11 LAB
ALBUMIN SERPL BCP-MCNC: 3.9 G/DL (ref 3.5–5.2)
ALP SERPL-CCNC: 44 U/L (ref 55–135)
ALT SERPL W/O P-5'-P-CCNC: 12 U/L (ref 10–44)
ANION GAP SERPL CALC-SCNC: 10 MMOL/L (ref 8–16)
AST SERPL-CCNC: 20 U/L (ref 10–40)
BILIRUB SERPL-MCNC: 0.5 MG/DL (ref 0.1–1)
BUN SERPL-MCNC: 50 MG/DL (ref 10–30)
CALCIUM SERPL-MCNC: 8.6 MG/DL (ref 8.7–10.5)
CHLORIDE SERPL-SCNC: 106 MMOL/L (ref 95–110)
CO2 SERPL-SCNC: 23 MMOL/L (ref 23–29)
CREAT SERPL-MCNC: 1.7 MG/DL (ref 0.5–1.4)
EST. GFR  (AFRICAN AMERICAN): 28.9 ML/MIN/1.73 M^2
EST. GFR  (NON AFRICAN AMERICAN): 25.1 ML/MIN/1.73 M^2
GLUCOSE SERPL-MCNC: 74 MG/DL (ref 70–110)
POTASSIUM SERPL-SCNC: 3.6 MMOL/L (ref 3.5–5.1)
PROT SERPL-MCNC: 7 G/DL (ref 6–8.4)
SODIUM SERPL-SCNC: 139 MMOL/L (ref 136–145)

## 2019-07-11 PROCEDURE — 80053 COMPREHEN METABOLIC PANEL: CPT

## 2019-07-11 PROCEDURE — 36415 COLL VENOUS BLD VENIPUNCTURE: CPT

## 2019-07-22 ENCOUNTER — APPOINTMENT (OUTPATIENT)
Dept: LAB | Facility: HOSPITAL | Age: 84
End: 2019-07-22
Attending: INTERNAL MEDICINE
Payer: MEDICARE

## 2019-07-22 DIAGNOSIS — N39.0 URINARY TRACT INFECTION, SITE NOT SPECIFIED: Primary | ICD-10-CM

## 2019-07-22 LAB
BACTERIA #/AREA URNS HPF: ABNORMAL /HPF
BILIRUB UR QL STRIP: NEGATIVE
CLARITY UR: CLEAR
COLOR UR: YELLOW
GLUCOSE UR QL STRIP: NEGATIVE
HGB UR QL STRIP: NEGATIVE
HYALINE CASTS #/AREA URNS LPF: ABNORMAL /LPF
KETONES UR QL STRIP: NEGATIVE
LEUKOCYTE ESTERASE UR QL STRIP: ABNORMAL
MICROSCOPIC COMMENT: ABNORMAL
NITRITE UR QL STRIP: NEGATIVE
NON-SQ EPI CELLS #/AREA URNS HPF: 1 /HPF
PH UR STRIP: 6 [PH] (ref 5–8)
PROT UR QL STRIP: ABNORMAL
RBC #/AREA URNS HPF: 3 /HPF (ref 0–4)
SP GR UR STRIP: 1.01 (ref 1–1.03)
SQUAMOUS #/AREA URNS HPF: 2 /HPF
URN SPEC COLLECT METH UR: ABNORMAL
UROBILINOGEN UR STRIP-ACNC: NEGATIVE EU/DL
WBC #/AREA URNS HPF: 12 /HPF (ref 0–5)
WBC CLUMPS URNS QL MICRO: ABNORMAL

## 2019-07-22 PROCEDURE — 87086 URINE CULTURE/COLONY COUNT: CPT

## 2019-07-22 PROCEDURE — 81000 URINALYSIS NONAUTO W/SCOPE: CPT

## 2019-07-22 PROCEDURE — 87077 CULTURE AEROBIC IDENTIFY: CPT

## 2019-07-22 PROCEDURE — 87186 SC STD MICRODIL/AGAR DIL: CPT

## 2019-07-22 PROCEDURE — 87088 URINE BACTERIA CULTURE: CPT

## 2019-07-24 LAB — BACTERIA UR CULT: ABNORMAL

## 2019-08-06 ENCOUNTER — LAB VISIT (OUTPATIENT)
Dept: LAB | Facility: HOSPITAL | Age: 84
End: 2019-08-06
Attending: INTERNAL MEDICINE
Payer: MEDICARE

## 2019-08-06 DIAGNOSIS — I50.20 HEART FAILURE, SYSTOLIC: ICD-10-CM

## 2019-08-06 DIAGNOSIS — E03.9 MYXEDEMA HEART DISEASE: ICD-10-CM

## 2019-08-06 DIAGNOSIS — I51.9 MYXEDEMA HEART DISEASE: ICD-10-CM

## 2019-08-06 DIAGNOSIS — I10 ESSENTIAL HYPERTENSION, MALIGNANT: Primary | ICD-10-CM

## 2019-08-06 LAB
ALBUMIN SERPL BCP-MCNC: 3.9 G/DL (ref 3.5–5.2)
ALP SERPL-CCNC: 41 U/L (ref 55–135)
ALT SERPL W/O P-5'-P-CCNC: 16 U/L (ref 10–44)
ANION GAP SERPL CALC-SCNC: 14 MMOL/L (ref 8–16)
AST SERPL-CCNC: 23 U/L (ref 10–40)
BASOPHILS # BLD AUTO: 0.01 K/UL (ref 0–0.2)
BASOPHILS NFR BLD: 0.2 % (ref 0–1.9)
BILIRUB SERPL-MCNC: 0.6 MG/DL (ref 0.1–1)
BUN SERPL-MCNC: 59 MG/DL (ref 10–30)
CALCIUM SERPL-MCNC: 8.8 MG/DL (ref 8.7–10.5)
CHLORIDE SERPL-SCNC: 103 MMOL/L (ref 95–110)
CO2 SERPL-SCNC: 21 MMOL/L (ref 23–29)
CREAT SERPL-MCNC: 2.8 MG/DL (ref 0.5–1.4)
DIFFERENTIAL METHOD: ABNORMAL
EOSINOPHIL # BLD AUTO: 0.1 K/UL (ref 0–0.5)
EOSINOPHIL NFR BLD: 2.3 % (ref 0–8)
ERYTHROCYTE [DISTWIDTH] IN BLOOD BY AUTOMATED COUNT: 16.4 % (ref 11.5–14.5)
EST. GFR  (AFRICAN AMERICAN): 15.7 ML/MIN/1.73 M^2
EST. GFR  (NON AFRICAN AMERICAN): 13.6 ML/MIN/1.73 M^2
GLUCOSE SERPL-MCNC: 85 MG/DL (ref 70–110)
HCT VFR BLD AUTO: 32.2 % (ref 37–48.5)
HGB BLD-MCNC: 9.9 G/DL (ref 12–16)
IMM GRANULOCYTES # BLD AUTO: 0.04 K/UL (ref 0–0.04)
IMM GRANULOCYTES NFR BLD AUTO: 0.8 % (ref 0–0.5)
LYMPHOCYTES # BLD AUTO: 1.1 K/UL (ref 1–4.8)
LYMPHOCYTES NFR BLD: 20.5 % (ref 18–48)
MCH RBC QN AUTO: 27.8 PG (ref 27–31)
MCHC RBC AUTO-ENTMCNC: 30.7 G/DL (ref 32–36)
MCV RBC AUTO: 90 FL (ref 82–98)
MONOCYTES # BLD AUTO: 0.7 K/UL (ref 0.3–1)
MONOCYTES NFR BLD: 13.3 % (ref 4–15)
NEUTROPHILS # BLD AUTO: 3.3 K/UL (ref 1.8–7.7)
NEUTROPHILS NFR BLD: 62.9 % (ref 38–73)
NRBC BLD-RTO: 0 /100 WBC
PLATELET # BLD AUTO: 194 K/UL (ref 150–350)
PMV BLD AUTO: 10.7 FL (ref 9.2–12.9)
POTASSIUM SERPL-SCNC: 3.9 MMOL/L (ref 3.5–5.1)
PROT SERPL-MCNC: 6.8 G/DL (ref 6–8.4)
RBC # BLD AUTO: 3.56 M/UL (ref 4–5.4)
SODIUM SERPL-SCNC: 138 MMOL/L (ref 136–145)
TSH SERPL DL<=0.005 MIU/L-ACNC: 2.73 UIU/ML (ref 0.34–5.6)
WBC # BLD AUTO: 5.18 K/UL (ref 3.9–12.7)

## 2019-08-06 PROCEDURE — 36415 COLL VENOUS BLD VENIPUNCTURE: CPT

## 2019-08-06 PROCEDURE — 84443 ASSAY THYROID STIM HORMONE: CPT

## 2019-08-06 PROCEDURE — 85025 COMPLETE CBC W/AUTO DIFF WBC: CPT

## 2019-08-06 PROCEDURE — 80053 COMPREHEN METABOLIC PANEL: CPT

## 2019-08-13 ENCOUNTER — LAB VISIT (OUTPATIENT)
Dept: LAB | Facility: HOSPITAL | Age: 84
End: 2019-08-13
Attending: INTERNAL MEDICINE
Payer: MEDICARE

## 2019-08-13 DIAGNOSIS — D64.9 ANEMIA, UNSPECIFIED: Primary | ICD-10-CM

## 2019-08-13 DIAGNOSIS — I50.9 HEART FAILURE, UNSPECIFIED: ICD-10-CM

## 2019-08-13 DIAGNOSIS — D51.0 PERNICIOUS ANEMIA: ICD-10-CM

## 2019-08-13 DIAGNOSIS — D50.9 IRON DEFICIENCY ANEMIA, UNSPECIFIED: ICD-10-CM

## 2019-08-13 DIAGNOSIS — E55.9 AVITAMINOSIS D: ICD-10-CM

## 2019-08-13 LAB
25(OH)D3+25(OH)D2 SERPL-MCNC: 33 NG/ML (ref 30–96)
BNP SERPL-MCNC: 764 PG/ML (ref 0–99)
ERYTHROCYTE [SEDIMENTATION RATE] IN BLOOD BY WESTERGREN METHOD: 45 MM/HR (ref 0–20)
FERRITIN SERPL-MCNC: 28 NG/ML (ref 20–300)
FOLATE SERPL-MCNC: 11.4 NG/ML (ref 4–24)
IRON SERPL-MCNC: 37 UG/DL (ref 30–160)
PTH-INTACT SERPL-MCNC: 114 PG/ML (ref 9–77)
SATURATED IRON: 9 % (ref 20–50)
TOTAL IRON BINDING CAPACITY: 400 UG/DL (ref 250–450)
TRANSFERRIN SERPL-MCNC: 270 MG/DL (ref 200–375)
VIT B12 SERPL-MCNC: 438 PG/ML (ref 210–950)

## 2019-08-13 PROCEDURE — 82728 ASSAY OF FERRITIN: CPT

## 2019-08-13 PROCEDURE — 82306 VITAMIN D 25 HYDROXY: CPT

## 2019-08-13 PROCEDURE — 82607 VITAMIN B-12: CPT

## 2019-08-13 PROCEDURE — 84207 ASSAY OF VITAMIN B-6: CPT

## 2019-08-13 PROCEDURE — 83540 ASSAY OF IRON: CPT

## 2019-08-13 PROCEDURE — 36415 COLL VENOUS BLD VENIPUNCTURE: CPT

## 2019-08-13 PROCEDURE — 83880 ASSAY OF NATRIURETIC PEPTIDE: CPT

## 2019-08-13 PROCEDURE — 82746 ASSAY OF FOLIC ACID SERUM: CPT

## 2019-08-13 PROCEDURE — 83970 ASSAY OF PARATHORMONE: CPT

## 2019-08-13 PROCEDURE — 85651 RBC SED RATE NONAUTOMATED: CPT

## 2019-08-17 LAB — PYRIDOXAL SERPL-MCNC: 6 UG/L (ref 5–50)

## 2019-08-20 ENCOUNTER — TELEPHONE (OUTPATIENT)
Dept: HEMATOLOGY/ONCOLOGY | Facility: CLINIC | Age: 84
End: 2019-08-20

## 2019-08-20 NOTE — TELEPHONE ENCOUNTER
----- Message from Janneth Galvin sent at 8/20/2019 10:18 AM CDT -----  Contact: Tawanna with  [facility]  Type:  Sooner Apoointment Request    Caller is requesting a sooner appointment.  Caller declined first available appointment listed below.  Caller will not accept being placed on the waitlist and is requesting a message be sent to doctor.    Name of Caller:  Tawanna with [facility]  When is the first available appointment?    Symptoms:  anemia refered by LAURO Che  Best Call Back Number:  [phone] ext [phone]  Additional Information:  Tawanna will be able to schedule for patient. Thanks!

## 2019-08-22 ENCOUNTER — LAB VISIT (OUTPATIENT)
Dept: LAB | Facility: HOSPITAL | Age: 84
End: 2019-08-22
Attending: INTERNAL MEDICINE
Payer: MEDICARE

## 2019-08-22 DIAGNOSIS — N18.30 CHRONIC KIDNEY DISEASE, STAGE III (MODERATE): Primary | ICD-10-CM

## 2019-08-22 DIAGNOSIS — I50.9 HEART FAILURE, UNSPECIFIED: ICD-10-CM

## 2019-08-22 LAB
PHOSPHATE SERPL-MCNC: 3.9 MG/DL (ref 2.7–4.5)
URATE SERPL-MCNC: 6.3 MG/DL (ref 2.4–5.7)

## 2019-08-22 PROCEDURE — 36415 COLL VENOUS BLD VENIPUNCTURE: CPT

## 2019-08-22 PROCEDURE — 84550 ASSAY OF BLOOD/URIC ACID: CPT

## 2019-08-22 PROCEDURE — 84100 ASSAY OF PHOSPHORUS: CPT

## 2019-09-03 ENCOUNTER — HOSPITAL ENCOUNTER (EMERGENCY)
Facility: HOSPITAL | Age: 84
Discharge: HOME | End: 2019-09-03
Attending: FAMILY MEDICINE
Payer: MEDICARE

## 2019-09-03 VITALS
RESPIRATION RATE: 18 BRPM | SYSTOLIC BLOOD PRESSURE: 176 MMHG | DIASTOLIC BLOOD PRESSURE: 78 MMHG | HEART RATE: 84 BPM | HEIGHT: 62 IN | TEMPERATURE: 99 F | WEIGHT: 120 LBS | OXYGEN SATURATION: 94 % | BODY MASS INDEX: 22.08 KG/M2

## 2019-09-03 DIAGNOSIS — S51.812A LACERATION OF LEFT FOREARM, INITIAL ENCOUNTER: Primary | ICD-10-CM

## 2019-09-03 PROCEDURE — 12002 RPR S/N/AX/GEN/TRNK2.6-7.5CM: CPT | Mod: LT

## 2019-09-03 PROCEDURE — 99282 EMERGENCY DEPT VISIT SF MDM: CPT | Mod: 25

## 2019-09-03 RX ORDER — CLOPIDOGREL BISULFATE 75 MG/1
75 TABLET ORAL DAILY
COMMUNITY

## 2019-09-03 RX ORDER — LANOLIN ALCOHOL/MO/W.PET/CERES
400 CREAM (GRAM) TOPICAL DAILY
COMMUNITY

## 2019-09-03 RX ORDER — CALCIUM CARBONATE 500(1250)
1 TABLET ORAL DAILY
COMMUNITY

## 2019-09-03 RX ORDER — LEVOTHYROXINE SODIUM 88 UG/1
88 TABLET ORAL DAILY
COMMUNITY

## 2019-09-03 RX ORDER — ACETAMINOPHEN, DIPHENHYDRAMINE HCL, PHENYLEPHRINE HCL 325; 25; 5 MG/1; MG/1; MG/1
5 TABLET ORAL
COMMUNITY

## 2019-09-03 RX ORDER — LOPERAMIDE HYDROCHLORIDE 2 MG/1
2 CAPSULE ORAL 4 TIMES DAILY PRN
COMMUNITY

## 2019-09-03 RX ORDER — ASCORBIC ACID 500 MG
500 TABLET ORAL DAILY
COMMUNITY

## 2019-09-03 RX ORDER — ISOSORBIDE MONONITRATE 30 MG/1
30 TABLET, EXTENDED RELEASE ORAL DAILY
COMMUNITY

## 2019-09-03 RX ORDER — FERROUS SULFATE 220 (44)/5
220 SOLUTION, ORAL ORAL DAILY
COMMUNITY

## 2019-09-03 NOTE — DISCHARGE INSTRUCTIONS
Clean wound twice daily with soap and water leave initial bandage on for 24 hr after 24 hr began cleaning wound with soap water twice daily.    Follow-up with primary care in 7-10 days for suture removal    May take over-the-counter Tylenol as needed for pain.    If redness or purulent drainage begins to come from the wound return to ER immediately for re-evaluation.

## 2019-09-03 NOTE — ED PROVIDER NOTES
Encounter Date: 9/3/2019       History     Chief Complaint   Patient presents with    Fall     puncture wound on left forearm.     Patient presents to the ER post mechanical fall.  Patient states was trying to get to the bathroom too fast states tripped and hit her arm on something in the restroom patient states isn't exactly sure what she hit her arm on denied hitting her head denied headache denied any loss of consciousness denied nausea vomiting diarrhea denies any numbness or tingling denies any pain currently to the laceration.  Bleeding controlled prior to arrival in the ER nursing home staff evaluated the patient's arm and stated she needed to come to the ER for sutures.  Patient denies any other associated symptoms.    The history is provided by the patient.     Review of patient's allergies indicates:  No Known Allergies  Past Medical History:   Diagnosis Date    Arthritis     Hypertension     Stroke 2006     Past Surgical History:   Procedure Laterality Date    COLON SURGERY      HYSTERECTOMY       No family history on file.  Social History     Tobacco Use    Smoking status: Never Smoker    Smokeless tobacco: Never Used   Substance Use Topics    Alcohol use: No     Frequency: Never    Drug use: No     Review of Systems   Constitutional: Negative for fever.   HENT: Negative for sore throat.    Respiratory: Negative for shortness of breath.    Cardiovascular: Negative for chest pain.   Gastrointestinal: Negative for nausea.   Genitourinary: Negative for dysuria.   Musculoskeletal: Negative for back pain.   Skin: Positive for wound (See above). Negative for rash.   Neurological: Negative for weakness.   Hematological: Does not bruise/bleed easily.   All other systems reviewed and are negative.      Physical Exam     Initial Vitals [09/03/19 1642]   BP Pulse Resp Temp SpO2   (!) 176/78 84 18 98.8 °F (37.1 °C) (!) 94 %      MAP       --         Physical Exam    Nursing note and vitals  reviewed.  Constitutional: She appears well-developed and well-nourished. She is not diaphoretic. No distress.   HENT:   Head: Atraumatic.   Eyes: Right eye exhibits no discharge. Left eye exhibits no discharge.   Neck: Normal range of motion. Neck supple.   Cardiovascular: Normal rate, regular rhythm, normal heart sounds and intact distal pulses. Exam reveals no gallop and no friction rub.    No murmur heard.  Pulmonary/Chest: Breath sounds normal. No respiratory distress. She has no wheezes. She has no rhonchi. She has no rales. She exhibits no tenderness.   Musculoskeletal: Normal range of motion. She exhibits no tenderness.        Right forearm: Normal.        Left forearm: She exhibits laceration. She exhibits no tenderness, no bony tenderness, no swelling, no edema and no deformity.        Arms:  Neurological: She is alert and oriented to person, place, and time. GCS score is 15. GCS eye subscore is 4. GCS verbal subscore is 5. GCS motor subscore is 6.   Skin: Skin is warm and dry. Capillary refill takes less than 2 seconds.   Psychiatric: She has a normal mood and affect. Thought content normal.         ED Course   Lac Repair  Date/Time: 9/3/2019 5:43 PM  Performed by: ASTER Saleem  Authorized by: Hector Flores MD   Consent Done: Yes  Consent: Verbal consent obtained.  Risks and benefits: risks, benefits and alternatives were discussed  Consent given by: patient  Patient understanding: patient states understanding of the procedure being performed  Body area: upper extremity  Location details: left lower arm  Laceration length: 3 cm  Foreign bodies: no foreign bodies  Tendon involvement: none  Nerve involvement: none  Vascular damage: no  Anesthesia: local infiltration    Anesthesia:  Local Anesthetic: lidocaine 1% without epinephrine  Anesthetic total: 3 mL  Patient sedated: no  Preparation: Patient was prepped and draped in the usual sterile fashion.  Irrigation solution: Hibiclens and  saline.  Irrigation method: syringe  Amount of cleaning: standard  Debridement: none  Degree of undermining: none  Skin closure: 4-0 nylon  Number of sutures: 2  Technique: horizontal mattress  Approximation: close  Approximation difficulty: simple  Patient tolerance: Patient tolerated the procedure well with no immediate complications  Comments: Dressing applied by RN with direct supervision by me.        Labs Reviewed - No data to display       Imaging Results    None          Medical Decision Making:   Differential Diagnosis:   Laceration foreign body contusion abrasion  ED Management:  Discussed plan of care with the patient as well as the patient's daughter both verbalize any understood did not have any questions.    Discussed return to ER precautions as well as need for follow-up with the patient and the patient's daughter as well as at-home care both verbalized they understood did not have any questions.    This note was created using [employer] voice recognition software that occasionally misinterpreted phrases or words.                      Clinical Impression:       ICD-10-CM ICD-9-CM   1. Laceration of left forearm, initial encounter S51.812A 881.00                                ASTER Saleem  09/03/19 1744

## 2019-09-11 ENCOUNTER — TELEPHONE (OUTPATIENT)
Dept: INFUSION THERAPY | Facility: HOSPITAL | Age: 84
End: 2019-09-11

## 2019-09-11 ENCOUNTER — INITIAL CONSULT (OUTPATIENT)
Dept: HEMATOLOGY/ONCOLOGY | Facility: CLINIC | Age: 84
End: 2019-09-11
Payer: MEDICARE

## 2019-09-11 VITALS
RESPIRATION RATE: 18 BRPM | SYSTOLIC BLOOD PRESSURE: 215 MMHG | HEIGHT: 62 IN | BODY MASS INDEX: 22.08 KG/M2 | HEART RATE: 80 BPM | DIASTOLIC BLOOD PRESSURE: 97 MMHG | OXYGEN SATURATION: 99 % | WEIGHT: 120 LBS

## 2019-09-11 DIAGNOSIS — N18.9 ANEMIA IN CHRONIC KIDNEY DISEASE, UNSPECIFIED CKD STAGE: ICD-10-CM

## 2019-09-11 DIAGNOSIS — D50.9 IRON DEFICIENCY ANEMIA, UNSPECIFIED IRON DEFICIENCY ANEMIA TYPE: ICD-10-CM

## 2019-09-11 DIAGNOSIS — M10.9 ACUTE GOUT OF MULTIPLE SITES, UNSPECIFIED CAUSE: Primary | ICD-10-CM

## 2019-09-11 DIAGNOSIS — D63.1 ANEMIA IN CHRONIC KIDNEY DISEASE, UNSPECIFIED CKD STAGE: ICD-10-CM

## 2019-09-11 PROCEDURE — 99205 PR OFFICE/OUTPT VISIT, NEW, LEVL V, 60-74 MIN: ICD-10-PCS | Mod: S$PBB,,, | Performed by: INTERNAL MEDICINE

## 2019-09-11 PROCEDURE — 99999 PR PBB SHADOW E&M-EST. PATIENT-LVL III: CPT | Mod: PBBFAC,,, | Performed by: INTERNAL MEDICINE

## 2019-09-11 PROCEDURE — 99205 OFFICE O/P NEW HI 60 MIN: CPT | Mod: S$PBB,,, | Performed by: INTERNAL MEDICINE

## 2019-09-11 PROCEDURE — 99213 OFFICE O/P EST LOW 20 MIN: CPT | Mod: PBBFAC | Performed by: INTERNAL MEDICINE

## 2019-09-11 PROCEDURE — 99999 PR PBB SHADOW E&M-EST. PATIENT-LVL III: ICD-10-PCS | Mod: PBBFAC,,, | Performed by: INTERNAL MEDICINE

## 2019-09-11 RX ORDER — PREDNISONE 5 MG/1
5 TABLET ORAL DAILY
Qty: 5 TABLET | Refills: 0 | Status: SHIPPED | OUTPATIENT
Start: 2019-09-11 | End: 2019-09-16

## 2019-09-11 RX ORDER — SODIUM CHLORIDE 0.9 % (FLUSH) 0.9 %
10 SYRINGE (ML) INJECTION
Status: CANCELLED | OUTPATIENT
Start: 2019-09-18

## 2019-09-11 RX ORDER — ALLOPURINOL 100 MG/1
50 TABLET ORAL DAILY
Qty: 30 TABLET | Refills: 1 | Status: SHIPPED | OUTPATIENT
Start: 2019-09-11 | End: 2019-09-11 | Stop reason: SDUPTHER

## 2019-09-11 RX ORDER — SODIUM CHLORIDE 0.9 % (FLUSH) 0.9 %
10 SYRINGE (ML) INJECTION
Status: CANCELLED | OUTPATIENT
Start: 2019-09-11

## 2019-09-11 RX ORDER — POTASSIUM CHLORIDE 20 MEQ/15ML
SOLUTION ORAL
COMMUNITY
Start: 2019-08-14

## 2019-09-11 RX ORDER — HEPARIN 100 UNIT/ML
500 SYRINGE INTRAVENOUS
Status: CANCELLED | OUTPATIENT
Start: 2019-09-18

## 2019-09-11 RX ORDER — SULFAMETHOXAZOLE AND TRIMETHOPRIM 800; 160 MG/1; MG/1
TABLET ORAL
COMMUNITY
Start: 2019-07-29 | End: 2019-11-06

## 2019-09-11 RX ORDER — HEPARIN 100 UNIT/ML
500 SYRINGE INTRAVENOUS
Status: CANCELLED | OUTPATIENT
Start: 2019-09-11

## 2019-09-11 RX ORDER — CHLORHEXIDINE GLUCONATE ORAL RINSE 1.2 MG/ML
SOLUTION DENTAL
COMMUNITY
Start: 2019-08-05

## 2019-09-11 RX ORDER — LISINOPRIL 2.5 MG/1
TABLET ORAL
COMMUNITY
Start: 2019-08-15

## 2019-09-11 RX ORDER — ALLOPURINOL 100 MG/1
50 TABLET ORAL DAILY
Qty: 30 TABLET | Refills: 1 | Status: SHIPPED | OUTPATIENT
Start: 2019-09-11

## 2019-09-11 RX ORDER — ESCITALOPRAM OXALATE 5 MG/1
TABLET ORAL
COMMUNITY
Start: 2019-09-05

## 2019-09-11 RX ORDER — OXYBUTYNIN CHLORIDE 10 MG/1
TABLET, EXTENDED RELEASE ORAL
COMMUNITY
Start: 2019-09-05

## 2019-09-11 NOTE — PROGRESS NOTES
Advice Only (anemia)      Chon Pickett is a 89 y.o.  This is a frail 89-year-old female in a wheelchair reports that she is fatigued in her hands and feet hurting.  She has slurred speech and is here with her family member.  She has a swollen 1st digit that is erythematous on her right hand an her left foot is swollen with red great toe.  She is wondering if she has gout.  She is extremely short of breath with the least bit of exertion and is here for evaluation of anemia.  She is on Plavix at this moment post stroke she is tolerating her blood pressure medications and Lexapro for depression    Past Medical History:   Diagnosis Date    Arthritis     Hypertension     Stroke 2006     Past Surgical History:   Procedure Laterality Date    COLON SURGERY      HYSTERECTOMY         Current Outpatient Medications:     ALPRAZolam (XANAX) 0.25 MG tablet, , Disp: , Rfl:     ALPRAZolam (XANAX) 0.5 MG tablet, Xanax 0.5 mg tablet  Take 1 tablet as needed by oral route., Disp: , Rfl:     ascorbic acid, vitamin C, (VITAMIN C) 500 MG tablet, Take 500 mg by mouth once daily., Disp: , Rfl:     calcium carbonate (OS-CHRISTIAN) 500 mg calcium (1,250 mg) tablet, Take 1 tablet by mouth once daily., Disp: , Rfl:     carvedilol (COREG) 3.125 MG tablet, Coreg 3.125 mg tablet  Take 1 tablet twice a day by oral route., Disp: , Rfl:     chlorhexidine (PERIDEX) 0.12 % solution, , Disp: , Rfl:     cloNIDine (CATAPRES) 0.1 MG tablet, , Disp: , Rfl:     clopidogrel (PLAVIX) 75 mg tablet, Take 75 mg by mouth once daily., Disp: , Rfl:     escitalopram oxalate (LEXAPRO) 10 MG tablet, escitalopram 5 mg tablet, Disp: , Rfl:     escitalopram oxalate (LEXAPRO) 5 MG Tab, , Disp: , Rfl:     ferrous sulfate 220 mg (44 mg iron)/5 mL solution, Take 220 mg by mouth once daily., Disp: , Rfl:     fluticasone (FLONASE) 50 mcg/actuation nasal spray, fluticasone 50 mcg/actuation nasal spray,suspension, Disp: , Rfl:     furosemide (LASIX) 40 MG tablet,  , Disp: , Rfl:     hydrALAZINE (APRESOLINE) 25 MG tablet, , Disp: , Rfl:     isosorbide mononitrate (IMDUR) 30 MG 24 hr tablet, Take 30 mg by mouth once daily., Disp: , Rfl:     levothyroxine (SYNTHROID) 88 MCG tablet, Take 88 mcg by mouth once daily., Disp: , Rfl:     lisinopril (PRINIVIL,ZESTRIL) 2.5 MG tablet, , Disp: , Rfl:     loperamide (IMODIUM) 2 mg capsule, Take 2 mg by mouth 4 (four) times daily as needed for Diarrhea., Disp: , Rfl:     magnesium oxide (MAG-OX) 400 mg (241.3 mg magnesium) tablet, Take 400 mg by mouth once daily., Disp: , Rfl:     melatonin 10 mg Tab, Take 5 mg by mouth., Disp: , Rfl:     metoprolol tartrate (LOPRESSOR) 25 MG tablet, , Disp: , Rfl:     olopatadine (PATADAY) 0.2 % Drop, , Disp: , Rfl:     olopatadine (PATADAY) 0.2 % Drop, Pataday 0.2 % eye drops, Disp: , Rfl:     omeprazole (PRILOSEC) 40 MG capsule, Prilosec 40 mg capsule,delayed release  Take 1 capsule every day by oral route., Disp: , Rfl:     oxybutynin (DITROPAN-XL) 10 MG 24 hr tablet, , Disp: , Rfl:     potassium chloride 10% (KAYCIEL) 20 mEq/15 mL oral solution, , Disp: , Rfl:     potassium chloride SA (K-DUR,KLOR-CON) 10 MEQ tablet, , Disp: , Rfl:     ranitidine (ZANTAC) 150 MG tablet, , Disp: , Rfl:     sulfamethoxazole-trimethoprim 800-160mg (BACTRIM DS) 800-160 mg Tab, , Disp: , Rfl:   Review of patient's allergies indicates:  No Known Allergies  Social History     Tobacco Use    Smoking status: Never Smoker    Smokeless tobacco: Never Used   Substance Use Topics    Alcohol use: No     Frequency: Never    Drug use: No     History reviewed. No pertinent family history.    CONSTITUTIONAL: No fevers, chills, night sweats, intermittent wt. loss, appetite changes  SKIN: no rashes or itching  ENT: No headaches, head trauma, vision changes, or eye pain  LYMPH NODES: None noticed   CV: No chest pain, palpitations.   RESP:  Positive dyspnea on exertion, no cough, wheezing, or hemoptysis  GI: No nausea,  "emesis, diarrhea, constipation, melena, hematochezia, pain.   : No dysuria, hematuria, urgency, or frequency   HEME: No easy bruising, bleeding problems  PSYCHIATRIC: No depression, anxiety, psychosis, hallucinations.  NEURO: No seizures, memory loss, dizziness or difficulty sleeping  MSK: No arthralgias or joint swelling         BP (!) 215/97   Pulse 80   Resp 18   Ht 5' 2" (1.575 m)   Wt 54.4 kg (120 lb)   SpO2 99%   BMI 21.95 kg/m²   Gen: NAD, A and O x3, frail temporal wasting slurred speech post stroke  Psych: pleasant affect, normal thought process  Eyes: Pupils round and non dilated, EOM intact  Nose: Nares patent  OP clear, mucosa patent  Neck: suppple, no JVD, trachea midline, no palpable mass, no adenopathy  Lungs: CTAB, no wheezes, no use of accessory muscles  CV: S1S2 with RRR, No mrg  Abd: soft, NTND, + BS, No HSM, no ascites  Extr: No CC positive edema ISRAEL, strength decreased good capillary refill  Neuro: CNs grossly intact  Skin: intact, no lesions noted  Rheum:  See HPI over the swelling of her digits which was explained and erythematous changes to her skin    Lab Results   Component Value Date    WBC 5.18 08/06/2019    HGB 9.9 (L) 08/06/2019    HCT 32.2 (L) 08/06/2019    MCV 90 08/06/2019     08/06/2019     CMP  Sodium   Date Value Ref Range Status   08/06/2019 138 136 - 145 mmol/L Final     Potassium   Date Value Ref Range Status   08/06/2019 3.9 3.5 - 5.1 mmol/L Final     Chloride   Date Value Ref Range Status   08/06/2019 103 95 - 110 mmol/L Final     CO2   Date Value Ref Range Status   08/06/2019 21 (L) 23 - 29 mmol/L Final     Glucose   Date Value Ref Range Status   08/06/2019 85 70 - 110 mg/dL Final     BUN, Bld   Date Value Ref Range Status   08/06/2019 59 (H) 10 - 30 mg/dL Final     Creatinine   Date Value Ref Range Status   08/06/2019 2.8 (H) 0.5 - 1.4 mg/dL Final     Calcium   Date Value Ref Range Status   08/06/2019 8.8 8.7 - 10.5 mg/dL Final     Total Protein   Date Value " Ref Range Status   08/06/2019 6.8 6.0 - 8.4 g/dL Final     Albumin   Date Value Ref Range Status   08/06/2019 3.9 3.5 - 5.2 g/dL Final     Total Bilirubin   Date Value Ref Range Status   08/06/2019 0.6 0.1 - 1.0 mg/dL Final     Comment:     For infants and newborns, interpretation of results should be based  on gestational age, weight and in agreement with clinical  observations.  Premature Infant recommended reference ranges:  Up to 24 hours.............<8.0 mg/dL  Up to 48 hours............<12.0 mg/dL  3-5 days..................<15.0 mg/dL  6-29 days.................<15.0 mg/dL       Alkaline Phosphatase   Date Value Ref Range Status   08/06/2019 41 (L) 55 - 135 U/L Final     AST   Date Value Ref Range Status   08/06/2019 23 10 - 40 U/L Final     ALT   Date Value Ref Range Status   08/06/2019 16 10 - 44 U/L Final     Anion Gap   Date Value Ref Range Status   08/06/2019 14 8 - 16 mmol/L Final     eGFR if    Date Value Ref Range Status   08/06/2019 15.7 (A) >60 mL/min/1.73 m^2 Final     eGFR if non    Date Value Ref Range Status   08/06/2019 13.6 (A) >60 mL/min/1.73 m^2 Final     Comment:     Calculation used to obtain the estimated glomerular filtration  rate (eGFR) is the CKD-EPI equation.          Acute gout of multiple sites, unspecified cause  -     predniSONE (DELTASONE) 5 MG tablet; Take 1 tablet (5 mg total) by mouth once daily. for 5 days  Dispense: 5 tablet; Refill: 0  -     Discontinue: allopurinol (ZYLOPRIM) 100 MG tablet; Take 0.5 tablets (50 mg total) by mouth once daily.  Dispense: 30 tablet; Refill: 1  -     allopurinol (ZYLOPRIM) 100 MG tablet; Take 0.5 tablets (50 mg total) by mouth once daily.  Dispense: 30 tablet; Refill: 1    Iron deficiency anemia, unspecified iron deficiency anemia type  -     CBC auto differential; Future; Expected date: 09/11/2019  -     Iron and TIBC; Future; Expected date: 09/11/2019    Anemia in chronic kidney disease, unspecified CKD  stage  -     CBC auto differential; Future; Expected date: 09/11/2019  -     Iron and TIBC; Future; Expected date: 09/11/2019        Normocytic anemia with chronic renal disease and iron deficiency.  Patient is symptomatic with dyspnea on exertion given her elevated uric acid level in clinical presentation I believe she is suffering with gout.  I cannot recommend allopurinol with her renal function at full dose hence I am giving her low-dose medication to help with this acute attack and then she must follow up with her primary care physician  She would benefit from 2 doses of intravenous iron to help stimulate her bone marrow to help her hemoglobin increased which would also help increase internal oxygenation make her feel better with less shortness of breath on exertion.  After placing her iron and like to see her back in approximately 5-6 weeks with repeat labs and further recs will made at that time  I have explained the lengthy differential diagnosis for anemia.Labs will be ordered to evaluate for bone marrow suppression, peripheral destruction as well as nutritional deficits. An ultrasound of the abdomen may be indicated to evaluate for hepatosplenomegaly which can lead to sequestration of cells. Ultimately a bone marrow biopsy and aspirate may be in order. SPEP and UPEP will also be included to look for a possible paraproteinemia.   She is to avoid any medications which could decrease her renal function further and avoid meds such as NSAIDs and H2 blockers which could cause further bone marrow suppression.  This steroid low-dose will work as an anti-inflammatory since colchicine is contraindicated renal dysfunction and she will start low-dose allopurinol after starting the prednisone for 2 weeks.  She is to continue Lopressor to help control heart rate and Synthroid for thyroid disorder  She is refusing to go to the emergency room for blood pressure evaluation hence I would like her to call her primary care  see him asap    Thank you for allowing me to evaluate and participate in the care of this pleasant patient. Please fell free to call me with any questions or concerns.    Warmly,  Lauryn Batista MD    This note was dictated with Dragon and briefly proofread. Please excuse any sentences that may be unclear or words misspelled

## 2019-09-12 ENCOUNTER — INFUSION (OUTPATIENT)
Dept: INFUSION THERAPY | Facility: HOSPITAL | Age: 84
End: 2019-09-12
Attending: INTERNAL MEDICINE
Payer: MEDICARE

## 2019-09-12 VITALS
OXYGEN SATURATION: 92 % | TEMPERATURE: 97 F | HEART RATE: 70 BPM | DIASTOLIC BLOOD PRESSURE: 74 MMHG | RESPIRATION RATE: 18 BRPM | SYSTOLIC BLOOD PRESSURE: 164 MMHG

## 2019-09-12 DIAGNOSIS — D50.9 IRON DEFICIENCY ANEMIA, UNSPECIFIED IRON DEFICIENCY ANEMIA TYPE: Primary | ICD-10-CM

## 2019-09-12 PROCEDURE — 96374 THER/PROPH/DIAG INJ IV PUSH: CPT

## 2019-09-12 PROCEDURE — 63600175 PHARM REV CODE 636 W HCPCS: Performed by: INTERNAL MEDICINE

## 2019-09-12 RX ORDER — HEPARIN 100 UNIT/ML
500 SYRINGE INTRAVENOUS
Status: DISCONTINUED | OUTPATIENT
Start: 2019-09-12 | End: 2019-09-12 | Stop reason: HOSPADM

## 2019-09-12 RX ORDER — SODIUM CHLORIDE 0.9 % (FLUSH) 0.9 %
10 SYRINGE (ML) INJECTION
Status: DISCONTINUED | OUTPATIENT
Start: 2019-09-12 | End: 2019-09-12 | Stop reason: HOSPADM

## 2019-09-12 RX ADMIN — FERRIC CARBOXYMALTOSE INJECTION 750 MG: 50 INJECTION, SOLUTION INTRAVENOUS at 01:09

## 2019-09-19 ENCOUNTER — INFUSION (OUTPATIENT)
Dept: INFUSION THERAPY | Facility: HOSPITAL | Age: 84
End: 2019-09-19
Attending: INTERNAL MEDICINE
Payer: MEDICARE

## 2019-09-19 VITALS
TEMPERATURE: 97 F | SYSTOLIC BLOOD PRESSURE: 200 MMHG | OXYGEN SATURATION: 96 % | HEART RATE: 62 BPM | RESPIRATION RATE: 18 BRPM | DIASTOLIC BLOOD PRESSURE: 102 MMHG

## 2019-09-19 DIAGNOSIS — D50.9 IRON DEFICIENCY ANEMIA, UNSPECIFIED IRON DEFICIENCY ANEMIA TYPE: Primary | ICD-10-CM

## 2019-09-19 PROCEDURE — 96374 THER/PROPH/DIAG INJ IV PUSH: CPT

## 2019-09-19 RX ORDER — SODIUM CHLORIDE 0.9 % (FLUSH) 0.9 %
10 SYRINGE (ML) INJECTION
Status: DISCONTINUED | OUTPATIENT
Start: 2019-09-19 | End: 2019-09-19 | Stop reason: HOSPADM

## 2019-09-19 RX ORDER — HEPARIN 100 UNIT/ML
500 SYRINGE INTRAVENOUS
Status: DISCONTINUED | OUTPATIENT
Start: 2019-09-19 | End: 2019-09-19 | Stop reason: HOSPADM

## 2019-09-19 NOTE — NURSING
0930 Infusion complete. Pt tolerated well. Site unremarkable. DS  0950 Pt has no signs of reaction at this time. Rt forearm site unremarkable with coban and gauze dressing. Pt discharged with medical assistant,Khushi, to nursing home. DS

## 2019-11-05 ENCOUNTER — LAB VISIT (OUTPATIENT)
Dept: LAB | Facility: HOSPITAL | Age: 84
End: 2019-11-05
Attending: INTERNAL MEDICINE
Payer: MEDICARE

## 2019-11-05 DIAGNOSIS — R53.81 DEBILITY: ICD-10-CM

## 2019-11-05 DIAGNOSIS — D64.9 ANEMIA, UNSPECIFIED: Primary | ICD-10-CM

## 2019-11-05 LAB
BASOPHILS # BLD AUTO: 0.01 K/UL (ref 0–0.2)
BASOPHILS NFR BLD: 0.2 % (ref 0–1.9)
DIFFERENTIAL METHOD: ABNORMAL
EOSINOPHIL # BLD AUTO: 0 K/UL (ref 0–0.5)
EOSINOPHIL NFR BLD: 0.6 % (ref 0–8)
ERYTHROCYTE [DISTWIDTH] IN BLOOD BY AUTOMATED COUNT: 16.5 % (ref 11.5–14.5)
HCT VFR BLD AUTO: 36.3 % (ref 37–48.5)
HGB BLD-MCNC: 12.2 G/DL (ref 12–16)
IMM GRANULOCYTES # BLD AUTO: 0.03 K/UL (ref 0–0.04)
IMM GRANULOCYTES NFR BLD AUTO: 0.6 % (ref 0–0.5)
IRON SERPL-MCNC: 63 UG/DL (ref 30–160)
LYMPHOCYTES # BLD AUTO: 0.9 K/UL (ref 1–4.8)
LYMPHOCYTES NFR BLD: 19 % (ref 18–48)
MCH RBC QN AUTO: 31.4 PG (ref 27–31)
MCHC RBC AUTO-ENTMCNC: 33.6 G/DL (ref 32–36)
MCV RBC AUTO: 94 FL (ref 82–98)
MONOCYTES # BLD AUTO: 0.6 K/UL (ref 0.3–1)
MONOCYTES NFR BLD: 12.3 % (ref 4–15)
NEUTROPHILS # BLD AUTO: 3.3 K/UL (ref 1.8–7.7)
NEUTROPHILS NFR BLD: 67.3 % (ref 38–73)
NRBC BLD-RTO: 0 /100 WBC
PLATELET # BLD AUTO: 210 K/UL (ref 150–350)
PMV BLD AUTO: 10.1 FL (ref 9.2–12.9)
RBC # BLD AUTO: 3.88 M/UL (ref 4–5.4)
SATURATED IRON: 25 % (ref 20–50)
TOTAL IRON BINDING CAPACITY: 253 UG/DL (ref 250–450)
TRANSFERRIN SERPL-MCNC: 171 MG/DL (ref 200–375)
WBC # BLD AUTO: 4.96 K/UL (ref 3.9–12.7)

## 2019-11-05 PROCEDURE — 85025 COMPLETE CBC W/AUTO DIFF WBC: CPT

## 2019-11-05 PROCEDURE — 83540 ASSAY OF IRON: CPT

## 2019-11-05 PROCEDURE — 36415 COLL VENOUS BLD VENIPUNCTURE: CPT

## 2019-11-06 ENCOUNTER — OFFICE VISIT (OUTPATIENT)
Dept: HEMATOLOGY/ONCOLOGY | Facility: CLINIC | Age: 84
End: 2019-11-06
Payer: MEDICARE

## 2019-11-06 VITALS
TEMPERATURE: 97 F | BODY MASS INDEX: 17.11 KG/M2 | DIASTOLIC BLOOD PRESSURE: 74 MMHG | OXYGEN SATURATION: 96 % | HEIGHT: 62 IN | HEART RATE: 64 BPM | SYSTOLIC BLOOD PRESSURE: 180 MMHG | WEIGHT: 93 LBS | RESPIRATION RATE: 18 BRPM

## 2019-11-06 DIAGNOSIS — I10 HYPERTENSION, UNSPECIFIED TYPE: ICD-10-CM

## 2019-11-06 DIAGNOSIS — D63.1 ANEMIA IN CHRONIC KIDNEY DISEASE, UNSPECIFIED CKD STAGE: ICD-10-CM

## 2019-11-06 DIAGNOSIS — D50.9 IRON DEFICIENCY ANEMIA, UNSPECIFIED IRON DEFICIENCY ANEMIA TYPE: Primary | ICD-10-CM

## 2019-11-06 DIAGNOSIS — N18.9 ANEMIA IN CHRONIC KIDNEY DISEASE, UNSPECIFIED CKD STAGE: ICD-10-CM

## 2019-11-06 DIAGNOSIS — E03.9 HYPOTHYROIDISM, UNSPECIFIED TYPE: ICD-10-CM

## 2019-11-06 PROCEDURE — 99215 OFFICE O/P EST HI 40 MIN: CPT | Mod: PBBFAC,25 | Performed by: INTERNAL MEDICINE

## 2019-11-06 PROCEDURE — 99999 PR PBB SHADOW E&M-EST. PATIENT-LVL V: CPT | Mod: PBBFAC,,, | Performed by: INTERNAL MEDICINE

## 2019-11-06 PROCEDURE — 99214 OFFICE O/P EST MOD 30 MIN: CPT | Mod: S$PBB,,, | Performed by: INTERNAL MEDICINE

## 2019-11-06 PROCEDURE — G0444 DEPRESSION SCREEN ANNUAL: HCPCS | Mod: PBBFAC | Performed by: INTERNAL MEDICINE

## 2019-11-06 PROCEDURE — 99214 PR OFFICE/OUTPT VISIT, EST, LEVL IV, 30-39 MIN: ICD-10-PCS | Mod: S$PBB,,, | Performed by: INTERNAL MEDICINE

## 2019-11-06 PROCEDURE — 99999 PR PBB SHADOW E&M-EST. PATIENT-LVL V: ICD-10-PCS | Mod: PBBFAC,,, | Performed by: INTERNAL MEDICINE

## 2019-11-06 RX ORDER — COLCHICINE 0.6 MG/1
TABLET ORAL
COMMUNITY
Start: 2019-09-11

## 2019-11-06 RX ORDER — ALPRAZOLAM 0.25 MG/1
TABLET ORAL
COMMUNITY
Start: 2019-09-23

## 2019-11-06 NOTE — LETTER
November 13, 2019      Chris Urias MD  [address]  [facility] [place] [address]           [facility]  [address] [place] [address]  Phone: [phone]          Patient: Chon Pickett   MR Number: [id number]   YOB: 1922   Date of Visit: 11/6/2019       Dear Dr. Chris Urias:    Thank you for referring Chon Pickett to me for evaluation. Attached you will find relevant portions of my assessment and plan of care.    If you have questions, please do not hesitate to call me. I look forward to following Chon Pickett along with you.    Sincerely,    Lauryn Batista MD    Enclosure  CC:  No Recipients    If you would like to receive this communication electronically, please contact [email or web address] or [phone] to request more information on "[employer] Link access.    For providers and/or their staff who would like to refer a patient to [facility], please contact us through our one-stop-shop provider referral line, [facility], at [phone].    If you feel you have received this communication in error or would no longer like to receive these types of communications, please e-mail [email or web address]

## 2019-11-06 NOTE — PROGRESS NOTES
Follow-up (Anemia, lab results)   Chief complaint I am tired    Chon Pickett is a 89 y.o.  This is a frail 89-year-old female in a wheelchair reports that she is fatigued in her hands and feet hurting.  She has slurred speech and is here with her family member.  She has a swollen 1st digit that is erythematous on her right hand an her left foot is swollen with red great toe.  She is wondering if she has gout.  She is extremely short of breath with the least bit of exertion and is here for evaluation of anemia.  She is on Plavix at this moment post stroke she is tolerating her blood pressure medications and Lexapro for depression  Patient is here to check her counts after receiving IV iron she reports no difference in her energy level    Past Medical History:   Diagnosis Date    Arthritis     Hypertension     Stroke 2006     Past Surgical History:   Procedure Laterality Date    COLON SURGERY      HYSTERECTOMY         Current Outpatient Medications:     allopurinol (ZYLOPRIM) 100 MG tablet, Take 0.5 tablets (50 mg total) by mouth once daily., Disp: 30 tablet, Rfl: 1    ALPRAZolam (XANAX) 0.25 MG tablet, , Disp: , Rfl:     ascorbic acid, vitamin C, (VITAMIN C) 500 MG tablet, Take 500 mg by mouth once daily., Disp: , Rfl:     calcium carbonate (OS-CHRISTIAN) 500 mg calcium (1,250 mg) tablet, Take 1 tablet by mouth once daily., Disp: , Rfl:     carvedilol (COREG) 3.125 MG tablet, Coreg 3.125 mg tablet  Take 1 tablet twice a day by oral route., Disp: , Rfl:     chlorhexidine (PERIDEX) 0.12 % solution, , Disp: , Rfl:     cloNIDine (CATAPRES) 0.1 MG tablet, , Disp: , Rfl:     clopidogrel (PLAVIX) 75 mg tablet, Take 75 mg by mouth once daily., Disp: , Rfl:     colchicine (COLCRYS) 0.6 mg tablet, , Disp: , Rfl:     escitalopram oxalate (LEXAPRO) 10 MG tablet, escitalopram 5 mg tablet, Disp: , Rfl:     escitalopram oxalate (LEXAPRO) 5 MG Tab, , Disp: , Rfl:     ferrous sulfate 220 mg (44 mg iron)/5 mL solution,  Take 220 mg by mouth once daily., Disp: , Rfl:     fluticasone (FLONASE) 50 mcg/actuation nasal spray, fluticasone 50 mcg/actuation nasal spray,suspension, Disp: , Rfl:     furosemide (LASIX) 40 MG tablet, , Disp: , Rfl:     hydrALAZINE (APRESOLINE) 25 MG tablet, , Disp: , Rfl:     isosorbide mononitrate (IMDUR) 30 MG 24 hr tablet, Take 30 mg by mouth once daily., Disp: , Rfl:     levothyroxine (SYNTHROID) 88 MCG tablet, Take 88 mcg by mouth once daily., Disp: , Rfl:     lisinopril (PRINIVIL,ZESTRIL) 2.5 MG tablet, , Disp: , Rfl:     loperamide (IMODIUM) 2 mg capsule, Take 2 mg by mouth 4 (four) times daily as needed for Diarrhea., Disp: , Rfl:     magnesium oxide (MAG-OX) 400 mg (241.3 mg magnesium) tablet, Take 400 mg by mouth once daily., Disp: , Rfl:     melatonin 10 mg Tab, Take 5 mg by mouth., Disp: , Rfl:     metoprolol tartrate (LOPRESSOR) 25 MG tablet, , Disp: , Rfl:     olopatadine (PATADAY) 0.2 % Drop, Pataday 0.2 % eye drops, Disp: , Rfl:     omeprazole (PRILOSEC) 40 MG capsule, Prilosec 40 mg capsule,delayed release  Take 1 capsule every day by oral route., Disp: , Rfl:     oxybutynin (DITROPAN-XL) 10 MG 24 hr tablet, , Disp: , Rfl:     potassium chloride 10% (KAYCIEL) 20 mEq/15 mL oral solution, , Disp: , Rfl:     ranitidine (ZANTAC) 150 MG tablet, , Disp: , Rfl:   Review of patient's allergies indicates:  No Known Allergies  Social History     Tobacco Use    Smoking status: Never Smoker    Smokeless tobacco: Never Used   Substance Use Topics    Alcohol use: No     Frequency: Never    Drug use: No     History reviewed. No pertinent family history.    CONSTITUTIONAL: No fevers, chills, night sweats, intermittent wt. loss, appetite changes  SKIN: no rashes or itching  ENT: No headaches, head trauma, vision changes, or eye pain  LYMPH NODES: None noticed   CV: No chest pain, palpitations.   RESP:  Positive dyspnea on exertion, no cough, wheezing, or hemoptysis  GI: No nausea, emesis,  "diarrhea, constipation, melena, hematochezia, pain.   : No dysuria, hematuria, urgency, or frequency   HEME: No easy bruising, bleeding problems  PSYCHIATRIC: No depression, anxiety, psychosis, hallucinations.  NEURO: No seizures, memory loss, dizziness or difficulty sleeping  MSK: No arthralgias or joint swelling         BP (!) 180/74   Pulse 64   Temp 96.6 °F (35.9 °C) (Oral)   Resp 18   Ht 5' 2" (1.575 m)   Wt 42.2 kg (93 lb)   SpO2 96%   BMI 17.01 kg/m²   Gen: NAD, A and O x3, frail temporal wasting slurred speech post stroke  Psych: pleasant affect, normal thought process  Eyes: Pupils round and non dilated, EOM intact  Nose: Nares patent  OP clear, mucosa patent  Neck: suppple, no JVD, trachea midline, no palpable mass, no adenopathy  Lungs: CTAB, no wheezes, no use of accessory muscles  CV: S1S2 with RRR, No mrg  Abd: soft, NTND, + BS, No HSM, no ascites  Extr: No CC positive edema ISRAEL, strength decreased good capillary refill  Neuro: CNs grossly intact  Skin: intact, no lesions noted  Rheum:  See HPI over the swelling of her digits which was explained and erythematous changes to her skin    Lab Results   Component Value Date    WBC 4.96 11/05/2019    HGB 12.2 11/05/2019    HCT 36.3 (L) 11/05/2019    MCV 94 11/05/2019     11/05/2019     CMP  Sodium   Date Value Ref Range Status   08/06/2019 138 136 - 145 mmol/L Final     Potassium   Date Value Ref Range Status   08/06/2019 3.9 3.5 - 5.1 mmol/L Final     Chloride   Date Value Ref Range Status   08/06/2019 103 95 - 110 mmol/L Final     CO2   Date Value Ref Range Status   08/06/2019 21 (L) 23 - 29 mmol/L Final     Glucose   Date Value Ref Range Status   08/06/2019 85 70 - 110 mg/dL Final     BUN, Bld   Date Value Ref Range Status   08/06/2019 59 (H) 10 - 30 mg/dL Final     Creatinine   Date Value Ref Range Status   08/06/2019 2.8 (H) 0.5 - 1.4 mg/dL Final     Calcium   Date Value Ref Range Status   08/06/2019 8.8 8.7 - 10.5 mg/dL Final     Total " Protein   Date Value Ref Range Status   08/06/2019 6.8 6.0 - 8.4 g/dL Final     Albumin   Date Value Ref Range Status   08/06/2019 3.9 3.5 - 5.2 g/dL Final     Total Bilirubin   Date Value Ref Range Status   08/06/2019 0.6 0.1 - 1.0 mg/dL Final     Comment:     For infants and newborns, interpretation of results should be based  on gestational age, weight and in agreement with clinical  observations.  Premature Infant recommended reference ranges:  Up to 24 hours.............<8.0 mg/dL  Up to 48 hours............<12.0 mg/dL  3-5 days..................<15.0 mg/dL  6-29 days.................<15.0 mg/dL       Alkaline Phosphatase   Date Value Ref Range Status   08/06/2019 41 (L) 55 - 135 U/L Final     AST   Date Value Ref Range Status   08/06/2019 23 10 - 40 U/L Final     ALT   Date Value Ref Range Status   08/06/2019 16 10 - 44 U/L Final     Anion Gap   Date Value Ref Range Status   08/06/2019 14 8 - 16 mmol/L Final     eGFR if    Date Value Ref Range Status   08/06/2019 15.7 (A) >60 mL/min/1.73 m^2 Final     eGFR if non    Date Value Ref Range Status   08/06/2019 13.6 (A) >60 mL/min/1.73 m^2 Final     Comment:     Calculation used to obtain the estimated glomerular filtration  rate (eGFR) is the CKD-EPI equation.          Iron deficiency anemia, unspecified iron deficiency anemia type  -     CBC auto differential; Future; Expected date: 11/06/2019  -     Iron and TIBC; Future; Expected date: 11/06/2019  -     FERRITIN; Future; Expected date: 11/06/2019    Anemia in chronic kidney disease, unspecified CKD stage    Hypertension, unspecified type    Hypothyroidism, unspecified type        Hemoglobin hematocrit much better after IV iron.  She has responded nicely.  She is still at risk of anemia especially due to renal disease.  For now she does not need any further intervention.  She should return to clinic in approximately 2 months to make sure that she does not need any further  intervention for such.  She is to continue Lopressor to help control heart rate and Synthroid for thyroid disorder     Thank you for allowing me to evaluate and participate in the care of this pleasant patient. Please fell free to call me with any questions or concerns.    Warmly,  Lauryn Batista MD    This note was dictated with Jaydenon and briefly proofread. Please excuse any sentences that may be unclear or words misspelled

## 2019-11-14 ENCOUNTER — LAB VISIT (OUTPATIENT)
Dept: LAB | Facility: HOSPITAL | Age: 84
End: 2019-11-14
Attending: NURSE PRACTITIONER
Payer: MEDICARE

## 2019-11-14 DIAGNOSIS — I10 ESSENTIAL HYPERTENSION, MALIGNANT: Primary | ICD-10-CM

## 2019-11-14 LAB
ANION GAP SERPL CALC-SCNC: 13 MMOL/L (ref 8–16)
BUN SERPL-MCNC: 22 MG/DL (ref 10–30)
CALCIUM SERPL-MCNC: 8.7 MG/DL (ref 8.7–10.5)
CHLORIDE SERPL-SCNC: 102 MMOL/L (ref 95–110)
CO2 SERPL-SCNC: 26 MMOL/L (ref 23–29)
CREAT SERPL-MCNC: 1.3 MG/DL (ref 0.5–1.4)
EST. GFR  (AFRICAN AMERICAN): 39.7 ML/MIN/1.73 M^2
EST. GFR  (NON AFRICAN AMERICAN): 34.5 ML/MIN/1.73 M^2
GLUCOSE SERPL-MCNC: 84 MG/DL (ref 70–110)
POTASSIUM SERPL-SCNC: 2 MMOL/L (ref 3.5–5.1)
SODIUM SERPL-SCNC: 141 MMOL/L (ref 136–145)

## 2019-11-14 PROCEDURE — 80048 BASIC METABOLIC PNL TOTAL CA: CPT

## 2019-11-14 PROCEDURE — 36415 COLL VENOUS BLD VENIPUNCTURE: CPT

## 2019-11-19 ENCOUNTER — LAB VISIT (OUTPATIENT)
Dept: LAB | Facility: HOSPITAL | Age: 84
End: 2019-11-19
Attending: NURSE PRACTITIONER
Payer: MEDICARE

## 2019-11-19 DIAGNOSIS — I10 ESSENTIAL HYPERTENSION, MALIGNANT: Primary | ICD-10-CM

## 2019-11-19 LAB
ANION GAP SERPL CALC-SCNC: 13 MMOL/L (ref 8–16)
BUN SERPL-MCNC: 20 MG/DL (ref 10–30)
CALCIUM SERPL-MCNC: 8.9 MG/DL (ref 8.7–10.5)
CHLORIDE SERPL-SCNC: 102 MMOL/L (ref 95–110)
CO2 SERPL-SCNC: 24 MMOL/L (ref 23–29)
CREAT SERPL-MCNC: 1.2 MG/DL (ref 0.5–1.4)
EST. GFR  (AFRICAN AMERICAN): 43.8 ML/MIN/1.73 M^2
EST. GFR  (NON AFRICAN AMERICAN): 38 ML/MIN/1.73 M^2
GLUCOSE SERPL-MCNC: 82 MG/DL (ref 70–110)
POTASSIUM SERPL-SCNC: 2 MMOL/L (ref 3.5–5.1)
SODIUM SERPL-SCNC: 139 MMOL/L (ref 136–145)

## 2019-11-19 PROCEDURE — 36415 COLL VENOUS BLD VENIPUNCTURE: CPT

## 2019-11-19 PROCEDURE — 80048 BASIC METABOLIC PNL TOTAL CA: CPT

## 2019-11-26 ENCOUNTER — LAB VISIT (OUTPATIENT)
Dept: LAB | Facility: HOSPITAL | Age: 84
End: 2019-11-26
Attending: INTERNAL MEDICINE
Payer: MEDICARE

## 2019-11-26 DIAGNOSIS — I10 ESSENTIAL HYPERTENSION, MALIGNANT: Primary | ICD-10-CM

## 2019-11-26 LAB — POTASSIUM SERPL-SCNC: 2.3 MMOL/L (ref 3.5–5.1)

## 2019-11-26 PROCEDURE — 36415 COLL VENOUS BLD VENIPUNCTURE: CPT

## 2019-11-26 PROCEDURE — 84132 ASSAY OF SERUM POTASSIUM: CPT

## 2019-12-03 ENCOUNTER — LAB VISIT (OUTPATIENT)
Dept: LAB | Facility: HOSPITAL | Age: 84
End: 2019-12-03
Attending: INTERNAL MEDICINE
Payer: MEDICARE

## 2019-12-03 DIAGNOSIS — E87.6 HYPOPOTASSEMIA: Primary | ICD-10-CM

## 2019-12-03 LAB
MAGNESIUM SERPL-MCNC: 1.7 MG/DL (ref 1.6–2.6)
POTASSIUM SERPL-SCNC: 2.1 MMOL/L (ref 3.5–5.1)

## 2019-12-03 PROCEDURE — 84132 ASSAY OF SERUM POTASSIUM: CPT

## 2019-12-03 PROCEDURE — 83735 ASSAY OF MAGNESIUM: CPT

## 2019-12-03 PROCEDURE — 36415 COLL VENOUS BLD VENIPUNCTURE: CPT

## 2019-12-04 DIAGNOSIS — N63.10 LUMP OF RIGHT BREAST: Primary | ICD-10-CM

## 2019-12-10 ENCOUNTER — LAB VISIT (OUTPATIENT)
Dept: LAB | Facility: HOSPITAL | Age: 84
End: 2019-12-10
Attending: INTERNAL MEDICINE
Payer: MEDICARE

## 2019-12-10 DIAGNOSIS — E87.6 HYPOPOTASSEMIA: Primary | ICD-10-CM

## 2019-12-10 LAB — POTASSIUM SERPL-SCNC: 2.4 MMOL/L (ref 3.5–5.1)

## 2019-12-10 PROCEDURE — 36415 COLL VENOUS BLD VENIPUNCTURE: CPT

## 2019-12-10 PROCEDURE — 84132 ASSAY OF SERUM POTASSIUM: CPT

## 2019-12-17 ENCOUNTER — LAB VISIT (OUTPATIENT)
Dept: LAB | Facility: HOSPITAL | Age: 84
End: 2019-12-17
Attending: NURSE PRACTITIONER
Payer: MEDICARE

## 2019-12-17 DIAGNOSIS — Z86.73 PERSONAL HISTORY OF TRANSIENT CEREBRAL ISCHEMIA: Primary | ICD-10-CM

## 2019-12-17 LAB — POTASSIUM SERPL-SCNC: 3.2 MMOL/L (ref 3.5–5.1)

## 2019-12-17 PROCEDURE — 36415 COLL VENOUS BLD VENIPUNCTURE: CPT

## 2019-12-17 PROCEDURE — 84132 ASSAY OF SERUM POTASSIUM: CPT

## 2019-12-18 ENCOUNTER — LAB VISIT (OUTPATIENT)
Dept: LAB | Facility: HOSPITAL | Age: 84
End: 2019-12-18
Attending: NURSE PRACTITIONER
Payer: MEDICARE

## 2019-12-18 DIAGNOSIS — Z86.73 PERSONAL HISTORY OF TRANSIENT CEREBRAL ISCHEMIA: Primary | ICD-10-CM

## 2019-12-18 DIAGNOSIS — E87.6 HYPOPOTASSEMIA: ICD-10-CM

## 2019-12-18 PROCEDURE — 84132 ASSAY OF SERUM POTASSIUM: CPT

## 2019-12-18 PROCEDURE — 36415 COLL VENOUS BLD VENIPUNCTURE: CPT

## 2019-12-19 LAB — POTASSIUM SERPL-SCNC: 3 MMOL/L (ref 3.5–5.1)

## 2019-12-23 ENCOUNTER — HOSPITAL ENCOUNTER (OUTPATIENT)
Dept: RADIOLOGY | Facility: HOSPITAL | Age: 84
Discharge: HOME | End: 2019-12-23
Attending: INTERNAL MEDICINE
Payer: MEDICARE

## 2019-12-23 VITALS — WEIGHT: 93.06 LBS | BODY MASS INDEX: 17.12 KG/M2 | HEIGHT: 62 IN

## 2019-12-23 DIAGNOSIS — N63.10 LUMP OF RIGHT BREAST: ICD-10-CM

## 2019-12-23 DIAGNOSIS — N64.59 ABNORMAL BREAST EXAM: ICD-10-CM

## 2019-12-23 DIAGNOSIS — R92.8 ABNORMAL MAMMOGRAM: Primary | ICD-10-CM

## 2019-12-23 DIAGNOSIS — N63.0 BREAST MASS IN FEMALE: ICD-10-CM

## 2019-12-23 PROCEDURE — 76642 US BREAST RIGHT LIMITED: ICD-10-PCS | Mod: 26,RT,, | Performed by: RADIOLOGY

## 2019-12-23 PROCEDURE — 76642 ULTRASOUND BREAST LIMITED: CPT | Mod: TC,RT

## 2019-12-23 PROCEDURE — 76642 ULTRASOUND BREAST LIMITED: CPT | Mod: 26,RT,, | Performed by: RADIOLOGY

## 2019-12-27 NOTE — ED NOTES
Report to Fidelina at [facility]. Discharge instructions given. Voiced understanding.    decreased activity level/inability to sleep

## 2020-01-06 ENCOUNTER — APPOINTMENT (OUTPATIENT)
Dept: LAB | Facility: HOSPITAL | Age: 85
End: 2020-01-06
Attending: NURSE PRACTITIONER
Payer: MEDICARE

## 2020-01-06 DIAGNOSIS — N39.0 URINARY TRACT INFECTION, SITE NOT SPECIFIED: Primary | ICD-10-CM

## 2020-01-06 LAB
BACTERIA #/AREA URNS HPF: ABNORMAL /HPF
BILIRUB UR QL STRIP: NEGATIVE
CLARITY UR: ABNORMAL
COLOR UR: YELLOW
GLUCOSE UR QL STRIP: NEGATIVE
HGB UR QL STRIP: ABNORMAL
HYALINE CASTS #/AREA URNS LPF: 0 /LPF
KETONES UR QL STRIP: NEGATIVE
LEUKOCYTE ESTERASE UR QL STRIP: ABNORMAL
MICROSCOPIC COMMENT: ABNORMAL
NITRITE UR QL STRIP: NEGATIVE
PH UR STRIP: 6 [PH] (ref 5–8)
PROT UR QL STRIP: ABNORMAL
RBC #/AREA URNS HPF: 50 /HPF (ref 0–4)
SP GR UR STRIP: 1.01 (ref 1–1.03)
URN SPEC COLLECT METH UR: ABNORMAL
UROBILINOGEN UR STRIP-ACNC: NEGATIVE EU/DL
WBC #/AREA URNS HPF: >100 /HPF (ref 0–5)

## 2020-01-06 PROCEDURE — 87186 SC STD MICRODIL/AGAR DIL: CPT

## 2020-01-06 PROCEDURE — 87086 URINE CULTURE/COLONY COUNT: CPT

## 2020-01-06 PROCEDURE — 87088 URINE BACTERIA CULTURE: CPT

## 2020-01-06 PROCEDURE — 87077 CULTURE AEROBIC IDENTIFY: CPT

## 2020-01-06 PROCEDURE — 81000 URINALYSIS NONAUTO W/SCOPE: CPT

## 2020-01-07 ENCOUNTER — LAB VISIT (OUTPATIENT)
Dept: LAB | Facility: HOSPITAL | Age: 85
End: 2020-01-07
Attending: INTERNAL MEDICINE
Payer: MEDICARE

## 2020-01-07 DIAGNOSIS — D64.9 ANEMIA, UNSPECIFIED: Primary | ICD-10-CM

## 2020-01-07 DIAGNOSIS — I10 ESSENTIAL HYPERTENSION, MALIGNANT: ICD-10-CM

## 2020-01-07 LAB
ANION GAP SERPL CALC-SCNC: 10 MMOL/L (ref 8–16)
BUN SERPL-MCNC: 23 MG/DL (ref 10–30)
CALCIUM SERPL-MCNC: 8.7 MG/DL (ref 8.7–10.5)
CHLORIDE SERPL-SCNC: 111 MMOL/L (ref 95–110)
CO2 SERPL-SCNC: 19 MMOL/L (ref 23–29)
CREAT SERPL-MCNC: 1.4 MG/DL (ref 0.5–1.4)
ERYTHROCYTE [DISTWIDTH] IN BLOOD BY AUTOMATED COUNT: 13.8 % (ref 11.5–14.5)
EST. GFR  (AFRICAN AMERICAN): 36.3 ML/MIN/1.73 M^2
EST. GFR  (NON AFRICAN AMERICAN): 31.5 ML/MIN/1.73 M^2
GLUCOSE SERPL-MCNC: 83 MG/DL (ref 70–110)
HCT VFR BLD AUTO: 33.6 % (ref 37–48.5)
HGB BLD-MCNC: 11.1 G/DL (ref 12–16)
MCH RBC QN AUTO: 31.6 PG (ref 27–31)
MCHC RBC AUTO-ENTMCNC: 33 G/DL (ref 32–36)
MCV RBC AUTO: 96 FL (ref 82–98)
PLATELET # BLD AUTO: 244 K/UL (ref 150–350)
PMV BLD AUTO: 10.5 FL (ref 9.2–12.9)
POTASSIUM SERPL-SCNC: 2.2 MMOL/L (ref 3.5–5.1)
RBC # BLD AUTO: 3.51 M/UL (ref 4–5.4)
SODIUM SERPL-SCNC: 140 MMOL/L (ref 136–145)
WBC # BLD AUTO: 5.66 K/UL (ref 3.9–12.7)

## 2020-01-07 PROCEDURE — 85027 COMPLETE CBC AUTOMATED: CPT

## 2020-01-07 PROCEDURE — 80048 BASIC METABOLIC PNL TOTAL CA: CPT

## 2020-01-07 PROCEDURE — 36415 COLL VENOUS BLD VENIPUNCTURE: CPT

## 2020-01-08 PROBLEM — F32.A DEPRESSED: Status: ACTIVE | Noted: 2020-01-08

## 2020-01-08 PROBLEM — Z87.39 HISTORY OF GOUT: Status: ACTIVE | Noted: 2020-01-08

## 2020-01-08 PROBLEM — K21.9 GERD (GASTROESOPHAGEAL REFLUX DISEASE): Status: ACTIVE | Noted: 2020-01-08

## 2020-01-09 ENCOUNTER — OFFICE VISIT (OUTPATIENT)
Dept: HEMATOLOGY/ONCOLOGY | Facility: CLINIC | Age: 85
End: 2020-01-09
Payer: MEDICARE

## 2020-01-09 VITALS
DIASTOLIC BLOOD PRESSURE: 67 MMHG | WEIGHT: 93 LBS | SYSTOLIC BLOOD PRESSURE: 150 MMHG | HEART RATE: 52 BPM | TEMPERATURE: 98 F | OXYGEN SATURATION: 95 % | BODY MASS INDEX: 17.11 KG/M2 | RESPIRATION RATE: 20 BRPM | HEIGHT: 62 IN

## 2020-01-09 DIAGNOSIS — A49.02 RESISTANCE TO METHICILLIN: Primary | ICD-10-CM

## 2020-01-09 DIAGNOSIS — K52.9 COLITIS: ICD-10-CM

## 2020-01-09 DIAGNOSIS — C44.90 SKIN CANCER: ICD-10-CM

## 2020-01-09 DIAGNOSIS — A49.8 E COLI INFECTION: ICD-10-CM

## 2020-01-09 DIAGNOSIS — N39.0 URINARY TRACT INFECTION WITHOUT HEMATURIA, SITE UNSPECIFIED: ICD-10-CM

## 2020-01-09 DIAGNOSIS — D64.9 ANEMIA, UNSPECIFIED TYPE: ICD-10-CM

## 2020-01-09 LAB
BACTERIA UR CULT: ABNORMAL
BACTERIA UR CULT: ABNORMAL

## 2020-01-09 PROCEDURE — 99215 PR OFFICE/OUTPT VISIT, EST, LEVL V, 40-54 MIN: ICD-10-PCS | Mod: S$PBB,,, | Performed by: INTERNAL MEDICINE

## 2020-01-09 PROCEDURE — 1159F MED LIST DOCD IN RCRD: CPT | Mod: ,,, | Performed by: INTERNAL MEDICINE

## 2020-01-09 PROCEDURE — 99999 PR PBB SHADOW E&M-EST. PATIENT-LVL V: CPT | Mod: PBBFAC,,, | Performed by: INTERNAL MEDICINE

## 2020-01-09 PROCEDURE — 99999 PR PBB SHADOW E&M-EST. PATIENT-LVL V: ICD-10-PCS | Mod: PBBFAC,,, | Performed by: INTERNAL MEDICINE

## 2020-01-09 PROCEDURE — 1126F PR PAIN SEVERITY QUANTIFIED, NO PAIN PRESENT: ICD-10-PCS | Mod: ,,, | Performed by: INTERNAL MEDICINE

## 2020-01-09 PROCEDURE — 99215 OFFICE O/P EST HI 40 MIN: CPT | Mod: S$PBB,,, | Performed by: INTERNAL MEDICINE

## 2020-01-09 PROCEDURE — 1159F PR MEDICATION LIST DOCUMENTED IN MEDICAL RECORD: ICD-10-PCS | Mod: ,,, | Performed by: INTERNAL MEDICINE

## 2020-01-09 PROCEDURE — 99215 OFFICE O/P EST HI 40 MIN: CPT | Mod: PBBFAC,PO,25 | Performed by: INTERNAL MEDICINE

## 2020-01-09 PROCEDURE — G0444 DEPRESSION SCREEN ANNUAL: HCPCS | Mod: PBBFAC,PO | Performed by: INTERNAL MEDICINE

## 2020-01-09 PROCEDURE — 1126F AMNT PAIN NOTED NONE PRSNT: CPT | Mod: ,,, | Performed by: INTERNAL MEDICINE

## 2020-01-09 RX ORDER — METRONIDAZOLE 375 MG/1
375 CAPSULE ORAL 3 TIMES DAILY
Qty: 30 CAPSULE | Refills: 0 | Status: SHIPPED | OUTPATIENT
Start: 2020-01-09 | End: 2020-01-19

## 2020-01-09 RX ORDER — CIPROFLOXACIN 500 MG/5ML
500 KIT ORAL 2 TIMES DAILY
Qty: 100 ML | Refills: 0 | Status: SHIPPED | OUTPATIENT
Start: 2020-01-09 | End: 2020-01-09 | Stop reason: SDUPTHER

## 2020-01-09 RX ORDER — METRONIDAZOLE 375 MG/1
375 CAPSULE ORAL 3 TIMES DAILY
Qty: 30 CAPSULE | Refills: 0 | Status: SHIPPED | OUTPATIENT
Start: 2020-01-09 | End: 2020-01-09 | Stop reason: SDUPTHER

## 2020-01-09 RX ORDER — FLUOROURACIL 50 MG/G
CREAM TOPICAL 2 TIMES DAILY
Qty: 40 G | Refills: 0 | Status: SHIPPED | OUTPATIENT
Start: 2020-01-09

## 2020-01-09 RX ORDER — CIPROFLOXACIN 500 MG/5ML
500 KIT ORAL 2 TIMES DAILY
Qty: 100 ML | Refills: 0 | Status: SHIPPED | OUTPATIENT
Start: 2020-01-09 | End: 2020-01-19

## 2020-01-09 NOTE — LETTER
January 9, 2020      Chris Urias MD  [address]  [facility] [place] [address]           [facility]  [address]  [facility] [address]  Phone: [phone]          Patient: Chon Pickett   MR Number: [id number]   YOB: 1922   Date of Visit: 1/9/2020       Dear Dr. Chris Urias:    Thank you for referring Chon Pickett to me for evaluation. Attached you will find relevant portions of my assessment and plan of care.    If you have questions, please do not hesitate to call me. I look forward to following Chon Pickett along with you.    Sincerely,    Lauryn Batista MD    Enclosure  CC:  No Recipients    If you would like to receive this communication electronically, please contact [email or web address] or [phone] to request more information on [facility] access.    For providers and/or their staff who would like to refer a patient to [facility], please contact us through our one-stop-shop provider referral line, [facility] , at [phone].    If you feel you have received this communication in error or would no longer like to receive these types of communications, please e-mail [email or web address]

## 2020-01-09 NOTE — PROGRESS NOTES
No chief complaint on file.   Chief complaint I am tired    Chon Pickett is a 89 y.o.  This is a frail 89-year-old female in a wheelchair reports that she is fatigued in her hands and feet hurting.  She has slurred speech and is here with her family member.  She has a swollen 1st digit that is erythematous on her right hand an her left foot is swollen with red great toe.  She is wondering if she has gout.  She is extremely short of breath with the least bit of exertion and is here for evaluation of anemia.  She is on Plavix at this moment post stroke she is tolerating her blood pressure medications and Lexapro for depression  Patient is here to check her counts after receiving IV iron she reports no difference in her energy level    Pt with suspicious lesion in the breast, squamous cell of her skin on leg Right lower extremity   Here with low Kcl  Urine was checked and is positive for possible MR e coli  diarrhea      Past Medical History:   Diagnosis Date    Arthritis     Hypertension     Malignant neoplasm of skin     Non-melanoma skin cancer    Stroke 2006     Past Surgical History:   Procedure Laterality Date    COLON SURGERY      HYSTERECTOMY      OOPHORECTOMY         Current Outpatient Medications:     allopurinol (ZYLOPRIM) 100 MG tablet, Take 0.5 tablets (50 mg total) by mouth once daily., Disp: 30 tablet, Rfl: 1    ALPRAZolam (XANAX) 0.25 MG tablet, , Disp: , Rfl:     ascorbic acid, vitamin C, (VITAMIN C) 500 MG tablet, Take 500 mg by mouth once daily., Disp: , Rfl:     calcium carbonate (OS-CHRISTIAN) 500 mg calcium (1,250 mg) tablet, Take 1 tablet by mouth once daily., Disp: , Rfl:     carvedilol (COREG) 3.125 MG tablet, Coreg 3.125 mg tablet  Take 1 tablet twice a day by oral route., Disp: , Rfl:     chlorhexidine (PERIDEX) 0.12 % solution, , Disp: , Rfl:     cloNIDine (CATAPRES) 0.1 MG tablet, , Disp: , Rfl:     clopidogrel (PLAVIX) 75 mg tablet, Take 75 mg by mouth once daily., Disp: , Rfl:      colchicine (COLCRYS) 0.6 mg tablet, , Disp: , Rfl:     escitalopram oxalate (LEXAPRO) 10 MG tablet, escitalopram 5 mg tablet, Disp: , Rfl:     escitalopram oxalate (LEXAPRO) 5 MG Tab, , Disp: , Rfl:     ferrous sulfate 220 mg (44 mg iron)/5 mL solution, Take 220 mg by mouth once daily., Disp: , Rfl:     fluticasone (FLONASE) 50 mcg/actuation nasal spray, fluticasone 50 mcg/actuation nasal spray,suspension, Disp: , Rfl:     furosemide (LASIX) 40 MG tablet, , Disp: , Rfl:     hydrALAZINE (APRESOLINE) 25 MG tablet, , Disp: , Rfl:     isosorbide mononitrate (IMDUR) 30 MG 24 hr tablet, Take 30 mg by mouth once daily., Disp: , Rfl:     levothyroxine (SYNTHROID) 88 MCG tablet, Take 88 mcg by mouth once daily., Disp: , Rfl:     lisinopril (PRINIVIL,ZESTRIL) 2.5 MG tablet, , Disp: , Rfl:     loperamide (IMODIUM) 2 mg capsule, Take 2 mg by mouth 4 (four) times daily as needed for Diarrhea., Disp: , Rfl:     magnesium oxide (MAG-OX) 400 mg (241.3 mg magnesium) tablet, Take 400 mg by mouth once daily., Disp: , Rfl:     melatonin 10 mg Tab, Take 5 mg by mouth., Disp: , Rfl:     metoprolol tartrate (LOPRESSOR) 25 MG tablet, , Disp: , Rfl:     olopatadine (PATADAY) 0.2 % Drop, Pataday 0.2 % eye drops, Disp: , Rfl:     omeprazole (PRILOSEC) 40 MG capsule, Prilosec 40 mg capsule,delayed release  Take 1 capsule every day by oral route., Disp: , Rfl:     oxybutynin (DITROPAN-XL) 10 MG 24 hr tablet, , Disp: , Rfl:     potassium chloride 10% (KAYCIEL) 20 mEq/15 mL oral solution, , Disp: , Rfl:     ranitidine (ZANTAC) 150 MG tablet, , Disp: , Rfl:   Review of patient's allergies indicates:  No Known Allergies  Social History     Tobacco Use    Smoking status: Never Smoker    Smokeless tobacco: Never Used   Substance Use Topics    Alcohol use: No     Frequency: Never    Drug use: No     Family History   Problem Relation Age of Onset    Ovarian cancer Daughter     Breast cancer Neg Hx        CONSTITUTIONAL: No  "fevers, +  chills, night sweats   SKIN: no rashes or itching  ENT: No headaches, head trauma, vision changes   LYMPH NODES: None noticed   CV: No chest pain, palpitations.   RESP:  Positive dyspnea on exertion, no cough, wheezing   GI: No nausea, emesis ++ , diarrhea   : No dysuria, hematuria   HEME: No easy bruising, bleeding problems  PSYCHIATRIC: No depression, anxiety, psychosis   NEURO: No seizures, memory loss, dizziness or difficulty sleeping  MSK: No arthralgias or joint swelling         BP (!) 150/67   Pulse (!) 52   Temp 97.8 °F (36.6 °C)   Resp 20   Ht 5' 2" (1.575 m)   Wt 42.2 kg (93 lb)   SpO2 95%   BMI 17.01 kg/m²   Gen: NAD, A and O x3, frail temporal wasting slurred speech post stroke  Psych: pleasant affect, normal thought process  Eyes: Pupils round and non dilated, EOM intact  Nose: Nares patent  OP clear, mucosa patent  Neck: suppple, no JVD, trachea midline, no palpable mass   Lungs:  no use of accessory muscles  Abd: soft, NTND, + BS, No HSM, no ascites  Extr: No CC  ISRAEL  Neuro: CNs grossly intact  Skin: + skin lesions   Rheum:  See HPI     Lab Results   Component Value Date    WBC 5.66 01/07/2020    HGB 11.1 (L) 01/07/2020    HCT 33.6 (L) 01/07/2020    MCV 96 01/07/2020     01/07/2020     CMP  Sodium   Date Value Ref Range Status   01/07/2020 140 136 - 145 mmol/L Final     Potassium   Date Value Ref Range Status   01/07/2020 2.2 (LL) 3.5 - 5.1 mmol/L Final     Comment:     result(s) called and verbal readback obtained from   theodora sotelo @ [address] by [facility] 01/07/2020 10:10       Chloride   Date Value Ref Range Status   01/07/2020 111 (H) 95 - 110 mmol/L Final     CO2   Date Value Ref Range Status   01/07/2020 19 (L) 23 - 29 mmol/L Final     Glucose   Date Value Ref Range Status   01/07/2020 83 70 - 110 mg/dL Final     BUN, Bld   Date Value Ref Range Status   01/07/2020 23 10 - 30 mg/dL Final     Creatinine   Date Value Ref Range Status   01/07/2020 1.4 0.5 - 1.4 mg/dL Final "     Calcium   Date Value Ref Range Status   01/07/2020 8.7 8.7 - 10.5 mg/dL Final     Total Protein   Date Value Ref Range Status   08/06/2019 6.8 6.0 - 8.4 g/dL Final     Albumin   Date Value Ref Range Status   08/06/2019 3.9 3.5 - 5.2 g/dL Final     Total Bilirubin   Date Value Ref Range Status   08/06/2019 0.6 0.1 - 1.0 mg/dL Final     Comment:     For infants and newborns, interpretation of results should be based  on gestational age, weight and in agreement with clinical  observations.  Premature Infant recommended reference ranges:  Up to 24 hours.............<8.0 mg/dL  Up to 48 hours............<12.0 mg/dL  3-5 days..................<15.0 mg/dL  6-29 days.................<15.0 mg/dL       Alkaline Phosphatase   Date Value Ref Range Status   08/06/2019 41 (L) 55 - 135 U/L Final     AST   Date Value Ref Range Status   08/06/2019 23 10 - 40 U/L Final     ALT   Date Value Ref Range Status   08/06/2019 16 10 - 44 U/L Final     Anion Gap   Date Value Ref Range Status   01/07/2020 10 8 - 16 mmol/L Final     eGFR if    Date Value Ref Range Status   01/07/2020 36.3 (A) >60 mL/min/1.73 m^2 Final     eGFR if non    Date Value Ref Range Status   01/07/2020 31.5 (A) >60 mL/min/1.73 m^2 Final     Comment:     Calculation used to obtain the estimated glomerular filtration  rate (eGFR) is the CKD-EPI equation.          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 for 10 days  Dispense: 100 mL; Refill: 0  -     Discontinue: metroNIDAZOLE (FLAGYL) 375 mg capsule; Take 1 capsule (375 mg total) by mouth 3 (three) times daily. May mix in ice cream for 10 days  Dispense: 30 capsule; Refill: 0  -     Discontinue: Lactobac 40-Bifido 3-S.thermop (PROBIOTIC) 100 billion cell Cap; Take 1 capsule by mouth once daily. May mix in ice cream  Dispense: 90 capsule; Refill: 1  -     Urine culture; Future;  Expected date: 01/23/2020  -     Lactobac 40-Bifido 3-S.thermop (PROBIOTIC) 100 billion cell Cap; Take 1 capsule by mouth once daily. May mix in ice cream  Dispense: 90 capsule; Refill: 1  -     metroNIDAZOLE (FLAGYL) 375 mg capsule; Take 1 capsule (375 mg total) by mouth 3 (three) times daily. May mix in ice cream for 10 days  Dispense: 30 capsule; Refill: 0  -     ciprofloxacin (CIPRO) 500 mg/5 mL suspension; Take 5 mLs (500 mg total) by mouth 2 (two) times daily. for 10 days  Dispense: 100 mL; Refill: 0    E coli infection  -     Discontinue: ciprofloxacin (CIPRO) 500 mg/5 mL suspension; Take 5 mLs (500 mg total) by mouth 2 (two) times daily. for 10 days  Dispense: 100 mL; Refill: 0  -     Discontinue: metroNIDAZOLE (FLAGYL) 375 mg capsule; Take 1 capsule (375 mg total) by mouth 3 (three) times daily. May mix in ice cream for 10 days  Dispense: 30 capsule; Refill: 0  -     Discontinue: Lactobac 40-Bifido 3-S.thermop (PROBIOTIC) 100 billion cell Cap; Take 1 capsule by mouth once daily. May mix in ice cream  Dispense: 90 capsule; Refill: 1  -     STOOL FOR C DIFF; Future; Expected date: 01/09/2020  -     Lactobac 40-Bifido 3-S.thermop (PROBIOTIC) 100 billion cell Cap; Take 1 capsule by mouth once daily. May mix in ice cream  Dispense: 90 capsule; Refill: 1  -     metroNIDAZOLE (FLAGYL) 375 mg capsule; Take 1 capsule (375 mg total) by mouth 3 (three) times daily. May mix in ice cream for 10 days  Dispense: 30 capsule; Refill: 0  -     ciprofloxacin (CIPRO) 500 mg/5 mL suspension; Take 5 mLs (500 mg total) by mouth 2 (two) times daily. for 10 days  Dispense: 100 mL; Refill: 0    Colitis  -     Urine culture; Future; Expected date: 01/23/2020  -     STOOL FOR C DIFF; Future; Expected date: 01/09/2020    Urinary tract infection without hematuria, site unspecified  -     Urine culture; Future; Expected date: 01/23/2020  -     STOOL FOR C DIFF; Future; Expected date: 01/09/2020  -     CBC auto differential; Future;  Expected date: 01/09/2020  -     CMP; Future; Expected date: 01/09/2020  -     Iron and TIBC; Future; Expected date: 01/09/2020    Skin cancer  -     fluorouracil (EFUDEX) 5 % cream; Apply topically 2 (two) times daily.  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 : defer to home care rules and regs   cipro twice a day in ice cream  Flagyl three times a day in ice cream   Probiotics at least once a day     Will need C diff testing on stool and if positive may need oral vanco    Recheck urine culture in 10-14 days   F/U with pcp  Watch potassium which is low due to colitis   Replace with potassium mixed in ice cream and monitor closely     Efudex for skin cancer on leg twice  A day  otherwise keep leg dry     Will address the breast abn in a month   Currently must address colitis and elec deficiencies and UTI      Although topical fluorouracil is not approved for the treatment of Bowen's disease, it is widely used for this indication and for patients who refuse surgical treatment [[id number]]. It is especially valuable for large lesions (>3 cm in diameter) and in situations in which postoperative healing is compromised, such as lesions that involve the lower extremity in older adult patients or patients with venous stasis disease [48]. Topical fluorouracil is also useful to treat the widespread SCC in situ lesions that may occur in arsenical dermatitis or xeroderma pigmentosum    Start efudex cream on her right leg   Stop diarrhea : this is causing her hypokalemia     Hb stable     RTc 1 month with labs    Thank you for allowing me to evaluate and participate in the care of this pleasant patient. Please fell free to call me with any questions or  concerns.    Warmly,  Lauryn Batista MD    This note was dictated with Dragon and briefly proofread. Please excuse any sentences that may be unclear or words misspelled

## 2020-01-21 ENCOUNTER — LAB VISIT (OUTPATIENT)
Dept: LAB | Facility: HOSPITAL | Age: 85
End: 2020-01-21
Attending: NURSE PRACTITIONER
Payer: MEDICARE

## 2020-01-21 DIAGNOSIS — E87.1 HYPOSMOLALITY SYNDROME: Primary | ICD-10-CM

## 2020-01-21 LAB
BNP SERPL-MCNC: 640 PG/ML (ref 0–99)
POTASSIUM SERPL-SCNC: 3.3 MMOL/L (ref 3.5–5.1)

## 2020-01-21 PROCEDURE — 36415 COLL VENOUS BLD VENIPUNCTURE: CPT

## 2020-01-21 PROCEDURE — 84132 ASSAY OF SERUM POTASSIUM: CPT

## 2020-01-21 PROCEDURE — 83880 ASSAY OF NATRIURETIC PEPTIDE: CPT

## 2020-01-26 ENCOUNTER — HOSPITAL ENCOUNTER (EMERGENCY)
Facility: HOSPITAL | Age: 85
Discharge: HOME | End: 2020-01-26
Attending: EMERGENCY MEDICINE
Payer: MEDICARE

## 2020-01-26 VITALS
HEART RATE: 63 BPM | DIASTOLIC BLOOD PRESSURE: 76 MMHG | RESPIRATION RATE: 18 BRPM | BODY MASS INDEX: 17.84 KG/M2 | SYSTOLIC BLOOD PRESSURE: 178 MMHG | TEMPERATURE: 98 F | OXYGEN SATURATION: 99 % | HEIGHT: 58 IN | WEIGHT: 85 LBS

## 2020-01-26 DIAGNOSIS — S09.90XA INJURY OF HEAD, INITIAL ENCOUNTER: ICD-10-CM
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
## 2020-01-26 DIAGNOSIS — S00.03XA CONTUSION OF SCALP, INITIAL ENCOUNTER: ICD-10-CM

## 2020-01-26 PROCEDURE — 70450 CT HEAD/BRAIN W/O DYE: CPT | Mod: 26,,, | Performed by: RADIOLOGY

## 2020-01-26 PROCEDURE — 99284 EMERGENCY DEPT VISIT MOD MDM: CPT | Mod: 25

## 2020-01-26 PROCEDURE — 70450 CT HEAD WITHOUT CONTRAST: ICD-10-PCS | Mod: 26,,, | Performed by: RADIOLOGY

## 2020-01-26 PROCEDURE — 70450 CT HEAD/BRAIN W/O DYE: CPT | Mod: TC

## 2020-01-27 NOTE — ED NOTES
Attempted to call report multiple times with no answer. Pt has been discharged into the care of her granddaughter to be taken back to facility.

## 2020-01-27 NOTE — ED NOTES
Report given to Anali SANCHEZ, vital signs stable, no acute signs of distress. Visitor at bedside. Denies further needs.

## 2020-01-27 NOTE — ED PROVIDER NOTES
Encounter Date: 1/26/2020       History     Chief Complaint   Patient presents with    Fall     Very pleasant 89-year-old female who is nursing home bound but still ambulates with a walker fell backwards tonight in the occipital region of her head.  There was no loss of consciousness no bleeding no laceration.  There is a hematoma to the occipital region.  This same patient fell approximately 4-5 days ago from her wheelchair forward in her right eye.  X-ray was taken at that time which demonstrated no fracture.  The daughter reports the patient has been somewhat goofy since that time frame.  Patient denies any pain at this time.  Arrived per EMS.        Review of patient's allergies indicates:  No Known Allergies  Past Medical History:   Diagnosis Date    Arthritis     Hypertension     Malignant neoplasm of skin     Non-melanoma skin cancer    Stroke 2006     Past Surgical History:   Procedure Laterality Date    COLON SURGERY      HYSTERECTOMY      OOPHORECTOMY       Family History   Problem Relation Age of Onset    Ovarian cancer Daughter     Breast cancer Neg Hx      Social History     Tobacco Use    Smoking status: Never Smoker    Smokeless tobacco: Never Used   Substance Use Topics    Alcohol use: No     Frequency: Never    Drug use: No     Review of Systems   Constitutional: Negative.    HENT:        Positive ecchymoses about the patient's right eye down to the right maxilla region consistent with previous injury 4 days ago.   Eyes:        Ecchymosis around the patient's right eye.  Normal vision.  Normal extraocular muscles intact.   Cardiovascular: Negative.    Gastrointestinal: Negative.    All other systems reviewed and are negative.      Physical Exam     Initial Vitals [01/26/20 2142]   BP Pulse Resp Temp SpO2   (!) 178/76 63 18 98.3 °F (36.8 °C) 99 %      MAP       --         Physical Exam    Nursing note and vitals reviewed.  Constitutional: She appears well-developed and well-nourished.    HENT:   Head: Normocephalic and atraumatic.   Eyes: EOM are normal. Pupils are equal, round, and reactive to light.   Extraocular ecchymosis noted.   Neck: Normal range of motion.   Cardiovascular: Normal rate, regular rhythm and normal heart sounds.   Pulmonary/Chest: Breath sounds normal.   Abdominal: Soft.   Musculoskeletal: Normal range of motion.   Complete examination of all long bones and joints reveals no tenderness to palpation or decreased range of motion.   Neurological: She is alert. She has normal strength.   Patient is not oriented to person and place.  Not time.   Skin: Skin is warm.   Ecchymosis about the patient's right face and eye.   Psychiatric: She has a normal mood and affect. Thought content normal.         ED Course   Procedures  Labs Reviewed - No data to display       Imaging Results          CT Head Without Contrast (In process)                  Medical Decision Making:   Differential Diagnosis:   Lumbar fracture, compression fracture, head injury, cervical injury, intra-abdominal injury, skin injury, contusion, ecchymosis, hematoma, dislocation, sprain.    ED Management:  The patient has a granddaughter is here with her.  The patient is stable and alert.  I will do CT scan to rule out intracranial pathology.  If negative I will discharge patient back to the nursing home with her grand daughter.    CT scan negative. No acute findings.  I will discharge patient home at this time in the care of the grand order to the nursing home.                                 Clinical Impression:       ICD-10-CM ICD-9-CM   1. Fall, initial encounter W19.XXXA E888.9   2. Injury of head, initial encounter S09.90XA 959.01   3. Contusion of scalp, initial encounter S00.03XA 920         Disposition:   Disposition: Discharged  Condition: Stable                     Jayro Potts MD  01/26/20 5630

## 2020-01-30 ENCOUNTER — LAB VISIT (OUTPATIENT)
Dept: LAB | Facility: HOSPITAL | Age: 85
End: 2020-01-30
Attending: INTERNAL MEDICINE
Payer: MEDICARE

## 2020-01-30 DIAGNOSIS — D64.9 ANEMIA, UNSPECIFIED: ICD-10-CM

## 2020-01-30 DIAGNOSIS — N39.0 URINARY TRACT INFECTION, SITE NOT SPECIFIED: Primary | ICD-10-CM

## 2020-01-30 LAB
ALBUMIN SERPL BCP-MCNC: 3.6 G/DL (ref 3.5–5.2)
ALP SERPL-CCNC: 45 U/L (ref 55–135)
ALT SERPL W/O P-5'-P-CCNC: 10 U/L (ref 10–44)
ANION GAP SERPL CALC-SCNC: 10 MMOL/L (ref 8–16)
AST SERPL-CCNC: 19 U/L (ref 10–40)
BASOPHILS # BLD AUTO: 0.02 K/UL (ref 0–0.2)
BASOPHILS NFR BLD: 0.5 % (ref 0–1.9)
BILIRUB SERPL-MCNC: 0.3 MG/DL (ref 0.1–1)
BUN SERPL-MCNC: 27 MG/DL (ref 10–30)
CALCIUM SERPL-MCNC: 9.1 MG/DL (ref 8.7–10.5)
CHLORIDE SERPL-SCNC: 107 MMOL/L (ref 95–110)
CO2 SERPL-SCNC: 22 MMOL/L (ref 23–29)
CREAT SERPL-MCNC: 1.9 MG/DL (ref 0.5–1.4)
DIFFERENTIAL METHOD: ABNORMAL
EOSINOPHIL # BLD AUTO: 0.1 K/UL (ref 0–0.5)
EOSINOPHIL NFR BLD: 2.1 % (ref 0–8)
ERYTHROCYTE [DISTWIDTH] IN BLOOD BY AUTOMATED COUNT: 14.7 % (ref 11.5–14.5)
EST. GFR  (AFRICAN AMERICAN): 25.1 ML/MIN/1.73 M^2
EST. GFR  (NON AFRICAN AMERICAN): 21.8 ML/MIN/1.73 M^2
FERRITIN SERPL-MCNC: 577 NG/ML (ref 20–300)
GLUCOSE SERPL-MCNC: 88 MG/DL (ref 70–110)
HCT VFR BLD AUTO: 36.4 % (ref 37–48.5)
HGB BLD-MCNC: 11.4 G/DL (ref 12–16)
IMM GRANULOCYTES # BLD AUTO: 0.07 K/UL (ref 0–0.04)
IMM GRANULOCYTES NFR BLD AUTO: 1.8 % (ref 0–0.5)
IRON SERPL-MCNC: 66 UG/DL (ref 30–160)
LYMPHOCYTES # BLD AUTO: 1.1 K/UL (ref 1–4.8)
LYMPHOCYTES NFR BLD: 27.7 % (ref 18–48)
MCH RBC QN AUTO: 31.1 PG (ref 27–31)
MCHC RBC AUTO-ENTMCNC: 31.3 G/DL (ref 32–36)
MCV RBC AUTO: 100 FL (ref 82–98)
MONOCYTES # BLD AUTO: 0.6 K/UL (ref 0.3–1)
MONOCYTES NFR BLD: 14.4 % (ref 4–15)
NEUTROPHILS # BLD AUTO: 2 K/UL (ref 1.8–7.7)
NEUTROPHILS NFR BLD: 53.5 % (ref 38–73)
NRBC BLD-RTO: 0 /100 WBC
PLATELET # BLD AUTO: 180 K/UL (ref 150–350)
PMV BLD AUTO: 10.7 FL (ref 9.2–12.9)
POTASSIUM SERPL-SCNC: 4.1 MMOL/L (ref 3.5–5.1)
PROT SERPL-MCNC: 6.8 G/DL (ref 6–8.4)
RBC # BLD AUTO: 3.66 M/UL (ref 4–5.4)
SATURATED IRON: 32 % (ref 20–50)
SODIUM SERPL-SCNC: 139 MMOL/L (ref 136–145)
TOTAL IRON BINDING CAPACITY: 209 UG/DL (ref 250–450)
TRANSFERRIN SERPL-MCNC: 141 MG/DL (ref 200–375)
WBC # BLD AUTO: 3.82 K/UL (ref 3.9–12.7)

## 2020-01-30 PROCEDURE — 80053 COMPREHEN METABOLIC PANEL: CPT

## 2020-01-30 PROCEDURE — 82728 ASSAY OF FERRITIN: CPT

## 2020-01-30 PROCEDURE — 83540 ASSAY OF IRON: CPT

## 2020-01-30 PROCEDURE — 36415 COLL VENOUS BLD VENIPUNCTURE: CPT

## 2020-01-30 PROCEDURE — 85025 COMPLETE CBC W/AUTO DIFF WBC: CPT

## 2020-02-05 ENCOUNTER — OFFICE VISIT (OUTPATIENT)
Dept: HEMATOLOGY/ONCOLOGY | Facility: CLINIC | Age: 85
End: 2020-02-05
Payer: MEDICARE

## 2020-02-05 VITALS
OXYGEN SATURATION: 96 % | BODY MASS INDEX: 16.56 KG/M2 | SYSTOLIC BLOOD PRESSURE: 123 MMHG | HEART RATE: 67 BPM | RESPIRATION RATE: 16 BRPM | DIASTOLIC BLOOD PRESSURE: 58 MMHG | WEIGHT: 90 LBS | TEMPERATURE: 98 F | HEIGHT: 62 IN

## 2020-02-05 DIAGNOSIS — F32.A DEPRESSION, UNSPECIFIED DEPRESSION TYPE: ICD-10-CM

## 2020-02-05 DIAGNOSIS — R64 CACHEXIA: ICD-10-CM

## 2020-02-05 DIAGNOSIS — M62.50 MUSCULAR ATROPHY, UNSPECIFIED SITE: ICD-10-CM

## 2020-02-05 DIAGNOSIS — R53.1 WEAKNESS: ICD-10-CM

## 2020-02-05 DIAGNOSIS — Z51.5 END OF LIFE CARE: ICD-10-CM

## 2020-02-05 DIAGNOSIS — D63.1 ANEMIA IN CHRONIC KIDNEY DISEASE, UNSPECIFIED CKD STAGE: Primary | ICD-10-CM

## 2020-02-05 DIAGNOSIS — R63.4 WEIGHT LOSS: ICD-10-CM

## 2020-02-05 DIAGNOSIS — Z74.3 REQUIRES CONTINUOUS SUPERVISION FOR ACTIVITIES OF DAILY LIVING (ADL): ICD-10-CM

## 2020-02-05 DIAGNOSIS — N63.0 BREAST MASS: ICD-10-CM

## 2020-02-05 DIAGNOSIS — R53.81 DECLINING PERFORMANCE STATUS: ICD-10-CM

## 2020-02-05 DIAGNOSIS — N18.9 ANEMIA IN CHRONIC KIDNEY DISEASE, UNSPECIFIED CKD STAGE: Primary | ICD-10-CM

## 2020-02-05 DIAGNOSIS — R62.7 FTT (FAILURE TO THRIVE) IN ADULT: ICD-10-CM

## 2020-02-05 PROCEDURE — 99215 OFFICE O/P EST HI 40 MIN: CPT | Mod: S$PBB,25,, | Performed by: INTERNAL MEDICINE

## 2020-02-05 PROCEDURE — 99497 ADVNCD CARE PLAN 30 MIN: CPT | Mod: PBBFAC | Performed by: INTERNAL MEDICINE

## 2020-02-05 PROCEDURE — 99497 PR ADVNCD CARE PLAN 30 MIN: ICD-10-PCS | Mod: S$PBB,,, | Performed by: INTERNAL MEDICINE

## 2020-02-05 PROCEDURE — 99497 ADVNCD CARE PLAN 30 MIN: CPT | Mod: S$PBB,,, | Performed by: INTERNAL MEDICINE

## 2020-02-05 PROCEDURE — 99215 PR OFFICE/OUTPT VISIT, EST, LEVL V, 40-54 MIN: ICD-10-PCS | Mod: S$PBB,25,, | Performed by: INTERNAL MEDICINE

## 2020-02-05 PROCEDURE — 99215 OFFICE O/P EST HI 40 MIN: CPT | Mod: PBBFAC,25 | Performed by: INTERNAL MEDICINE

## 2020-02-05 PROCEDURE — 94760 N-INVAS EAR/PLS OXIMETRY 1: CPT | Mod: PBBFAC | Performed by: INTERNAL MEDICINE

## 2020-02-05 PROCEDURE — G0444 DEPRESSION SCREEN ANNUAL: HCPCS | Mod: PBBFAC | Performed by: INTERNAL MEDICINE

## 2020-02-05 PROCEDURE — 99999 PR PBB SHADOW E&M-EST. PATIENT-LVL V: CPT | Mod: PBBFAC,,, | Performed by: INTERNAL MEDICINE

## 2020-02-05 PROCEDURE — 99999 PR PBB SHADOW E&M-EST. PATIENT-LVL V: ICD-10-PCS | Mod: PBBFAC,,, | Performed by: INTERNAL MEDICINE

## 2020-02-05 RX ORDER — NITROFURANTOIN 25; 75 MG/1; MG/1
CAPSULE ORAL
COMMUNITY
Start: 2020-01-24

## 2020-02-05 RX ORDER — MUPIROCIN 20 MG/G
OINTMENT TOPICAL
COMMUNITY
Start: 2019-11-25

## 2020-02-05 RX ORDER — POTASSIUM CHLORIDE 20 MEQ/1
TABLET, EXTENDED RELEASE ORAL
COMMUNITY
Start: 2020-01-15

## 2020-02-05 RX ORDER — FAMOTIDINE 20 MG/1
TABLET, FILM COATED ORAL
COMMUNITY
Start: 2020-01-30

## 2020-02-05 RX ORDER — CHOLESTYRAMINE 4 G/9G
POWDER, FOR SUSPENSION ORAL
COMMUNITY
Start: 2019-12-11

## 2020-02-05 NOTE — PROGRESS NOTES
Follow-up (1 month follow up with lab results, anemia)   Chief complaint I do not feel well    Chon Pickett is a 89 y.o.  This is a frail 89-year-old female in a wheelchair reports that she is fatigued in her hands and feet hurting.  She has slurred speech and is here with her family member.  She has a swollen 1st digit that is erythematous on her right hand an her left foot is swollen with red great toe.  She is wondering if she has gout.  She is extremely short of breath with the least bit of exertion and is here for evaluation of anemia.  She is on Plavix at this moment post stroke she is tolerating her blood pressure medications and Lexapro for depression  Patient is here to check her counts after receiving IV iron   Her grand daughter is with her   Pt quit eating, she is in wheelchair, needs help with transfer  Frail losing weight    Pt with suspicious lesion in the breast, squamous cell of her skin on leg Right lower extremity   Here with low Kcl  Urine was checked and is positive for possible MR e coli  diarrhea      Past Medical History:   Diagnosis Date    Arthritis     Hypertension     Malignant neoplasm of skin     Non-melanoma skin cancer    Stroke 2006     Past Surgical History:   Procedure Laterality Date    COLON SURGERY      HYSTERECTOMY      OOPHORECTOMY         Current Outpatient Medications:     allopurinol (ZYLOPRIM) 100 MG tablet, Take 0.5 tablets (50 mg total) by mouth once daily., Disp: 30 tablet, Rfl: 1    ALPRAZolam (XANAX) 0.25 MG tablet, , Disp: , Rfl:     ascorbic acid, vitamin C, (VITAMIN C) 500 MG tablet, Take 500 mg by mouth once daily., Disp: , Rfl:     calcium carbonate (OS-CHRISTIAN) 500 mg calcium (1,250 mg) tablet, Take 1 tablet by mouth once daily., Disp: , Rfl:     carvedilol (COREG) 3.125 MG tablet, Coreg 3.125 mg tablet  Take 1 tablet twice a day by oral route., Disp: , Rfl:     chlorhexidine (PERIDEX) 0.12 % solution, , Disp: , Rfl:     cloNIDine (CATAPRES) 0.1 MG  tablet, , Disp: , Rfl:     clopidogrel (PLAVIX) 75 mg tablet, Take 75 mg by mouth once daily., Disp: , Rfl:     colchicine (COLCRYS) 0.6 mg tablet, , Disp: , Rfl:     escitalopram oxalate (LEXAPRO) 10 MG tablet, escitalopram 5 mg tablet, Disp: , Rfl:     escitalopram oxalate (LEXAPRO) 5 MG Tab, , Disp: , Rfl:     ferrous sulfate 220 mg (44 mg iron)/5 mL solution, Take 220 mg by mouth once daily., Disp: , Rfl:     fluorouracil (EFUDEX) 5 % cream, Apply topically 2 (two) times daily., Disp: 40 g, Rfl: 0    fluticasone (FLONASE) 50 mcg/actuation nasal spray, fluticasone 50 mcg/actuation nasal spray,suspension, Disp: , Rfl:     furosemide (LASIX) 40 MG tablet, , Disp: , Rfl:     hydrALAZINE (APRESOLINE) 25 MG tablet, , Disp: , Rfl:     isosorbide mononitrate (IMDUR) 30 MG 24 hr tablet, Take 30 mg by mouth once daily., Disp: , Rfl:     Lactobac 40-Bifido 3-S.thermop (PROBIOTIC) 100 billion cell Cap, Take 1 capsule by mouth once daily. May mix in ice cream, Disp: 90 capsule, Rfl: 1    levothyroxine (SYNTHROID) 88 MCG tablet, Take 88 mcg by mouth once daily., Disp: , Rfl:     lisinopril (PRINIVIL,ZESTRIL) 2.5 MG tablet, , Disp: , Rfl:     loperamide (IMODIUM) 2 mg capsule, Take 2 mg by mouth 4 (four) times daily as needed for Diarrhea., Disp: , Rfl:     magnesium oxide (MAG-OX) 400 mg (241.3 mg magnesium) tablet, Take 400 mg by mouth once daily., Disp: , Rfl:     melatonin 10 mg Tab, Take 5 mg by mouth., Disp: , Rfl:     metoprolol tartrate (LOPRESSOR) 25 MG tablet, , Disp: , Rfl:     olopatadine (PATADAY) 0.2 % Drop, Pataday 0.2 % eye drops, Disp: , Rfl:     omeprazole (PRILOSEC) 40 MG capsule, Prilosec 40 mg capsule,delayed release  Take 1 capsule every day by oral route., Disp: , Rfl:     oxybutynin (DITROPAN-XL) 10 MG 24 hr tablet, , Disp: , Rfl:     potassium chloride 10% (KAYCIEL) 20 mEq/15 mL oral solution, , Disp: , Rfl:     ranitidine (ZANTAC) 150 MG tablet, , Disp: , Rfl:   Review of  "patient's allergies indicates:  No Known Allergies  Social History     Tobacco Use    Smoking status: Never Smoker    Smokeless tobacco: Never Used   Substance Use Topics    Alcohol use: No     Frequency: Never    Drug use: No     Family History   Problem Relation Age of Onset    Ovarian cancer Daughter     Breast cancer Neg Hx        CONSTITUTIONAL: No fevers, +  chills, night sweats weight loss   SKIN: no rashes or itching  ENT: No headaches, head trauma, vision changes   LYMPH NODES: None noticed   CV: No chest pain, palpitations.   RESP:  Positive dyspnea on exertion, no cough, wheezing   GI: No nausea, emesis ++ , diarrhea   : No dysuria, hematuria   HEME: No easy bruising, bleeding problems  PSYCHIATRIC: +depression, no anxiety, psychosis   NEURO: No seizures,+  memory loss, +  dizziness no  difficulty sleeping  MSK: No arthralgias or joint swelling         BP (!) 123/58   Pulse 67   Temp 97.6 °F (36.4 °C) (Oral)   Resp 16   Ht 5' 2" (1.575 m)   Wt 40.8 kg (90 lb)   SpO2 96%   BMI 16.46 kg/m²   Gen: NAD, A and O x3, frail temporal wasting slurred speech post stroke  Psych: pleasant affect, normal thought process  Temporal wasting, muscle wasting  Eyes: Pupils round and non dilated, EOM intact  Nose: Nares patent  OP clear, mucosa patent  Neck: suppple, no JVD, trachea midline, no palpable mass   Lungs:  no use of accessory muscles  Abd: soft, NTND, + BS, No HSM, no ascites  Extr: No CC  [place]  Neuro: weakness decreased strength cannot stand without assistance  Skin: + skin lesions , better after cream  Rheum:  See HPI     Lab Results   Component Value Date    WBC 3.82 (L) 01/30/2020    HGB 11.4 (L) 01/30/2020    HCT 36.4 (L) 01/30/2020     (H) 01/30/2020     01/30/2020     CMP  Sodium   Date Value Ref Range Status   01/30/2020 139 136 - 145 mmol/L Final     Potassium   Date Value Ref Range Status   01/30/2020 4.1 3.5 - 5.1 mmol/L Final     Chloride   Date Value Ref Range Status "   01/30/2020 107 95 - 110 mmol/L Final     CO2   Date Value Ref Range Status   01/30/2020 22 (L) 23 - 29 mmol/L Final     Glucose   Date Value Ref Range Status   01/30/2020 88 70 - 110 mg/dL Final     BUN, Bld   Date Value Ref Range Status   01/30/2020 27 10 - 30 mg/dL Final     Creatinine   Date Value Ref Range Status   01/30/2020 1.9 (H) 0.5 - 1.4 mg/dL Final     Calcium   Date Value Ref Range Status   01/30/2020 9.1 8.7 - 10.5 mg/dL Final     Total Protein   Date Value Ref Range Status   01/30/2020 6.8 6.0 - 8.4 g/dL Final     Albumin   Date Value Ref Range Status   01/30/2020 3.6 3.5 - 5.2 g/dL Final     Total Bilirubin   Date Value Ref Range Status   01/30/2020 0.3 0.1 - 1.0 mg/dL Final     Comment:     For infants and newborns, interpretation of results should be based  on gestational age, weight and in agreement with clinical  observations.  Premature Infant recommended reference ranges:  Up to 24 hours.............<8.0 mg/dL  Up to 48 hours............<12.0 mg/dL  3-5 days..................<15.0 mg/dL  6-29 days.................<15.0 mg/dL       Alkaline Phosphatase   Date Value Ref Range Status   01/30/2020 45 (L) 55 - 135 U/L Final     AST   Date Value Ref Range Status   01/30/2020 19 10 - 40 U/L Final     ALT   Date Value Ref Range Status   01/30/2020 10 10 - 44 U/L Final     Anion Gap   Date Value Ref Range Status   01/30/2020 10 8 - 16 mmol/L Final     eGFR if    Date Value Ref Range Status   01/30/2020 25.1 (A) >60 mL/min/1.73 m^2 Final     eGFR if non    Date Value Ref Range Status   01/30/2020 21.8 (A) >60 mL/min/1.73 m^2 Final     Comment:     Calculation used to obtain the estimated glomerular filtration  rate (eGFR) is the CKD-EPI equation.          Anemia in chronic kidney disease, unspecified CKD stage    Depression, unspecified depression type    Weight loss    FTT (failure to thrive) in adult    Muscular atrophy, unspecified site    Breast  mass    Cachexia    Weakness    Requires continuous supervision for activities of daily living (ADL)    Declining performance status    End of life care          FTT  Leukopenia and anemia multifactorial ? Malnourished, weight loss  Depression ? Dementia memory loss   Cannot erform any ADLs without assistance  ECOG 3   Lengthy discussion end of life care and I am placing hospice consult   Efudex for skin cancer on leg twice  A day  BREAST cancer likely : given her age and progniosis I would nt recommend biopsy: pt and family agree     Thank you for allowing me to evaluate and participate in the care of this pleasant patient. Please fell free to call me with any questions or concerns.    Warmly,  Lauryn Batista MD    This note was dictated with Dragon and briefly proofread. Please excuse any sentences that may be unclear or words misspelled    Advance Care Planning     Power of   I initiated the process of advance care planning today and explained the importance of this process to the patient.  I introduced the concept of advance directives to the patient, as well. Then the patient received detailed information about the importance of designating a Health Care Power of  (HCPOA). She was also instructed to communicate with this person about their wishes for future healthcare, should she become sick and lose decision-making capacity. The patient has not previously appointed a HCPOA.   Advance Care Planning     Living Will  During this visit, I engaged the patient  in the advance care planning process.  The patient and I reviewed the role for advance directives and their purpose in directing future healthcare if the patient's unable to speak for him/herself.  At this point in time, the patient does have full decision-making capacity.  We discussed different extreme health states that she could experience, and reviewed what kind of medical care she would want in those situations.  The patient communicated  that if she were comatose and had little chance of a meaningful recovery, she would not want machines/life-sustaining treatments used. I I spent a total of  30 minutes engaging the patient in this advance care planning discussion.

## 2020-02-07 ENCOUNTER — TELEPHONE (OUTPATIENT)
Dept: HEMATOLOGY/ONCOLOGY | Facility: CLINIC | Age: 85
End: 2020-02-07

## 2020-02-07 NOTE — TELEPHONE ENCOUNTER
Confirmed fax number. Notes sent over as requested.     ----- Message from Hiwot Carlisle sent at 2/7/2020  9:53 AM CST -----  Contact: [facility]    Please fax to: [phone]    Call back: [phone]